# Patient Record
Sex: FEMALE | Race: BLACK OR AFRICAN AMERICAN | NOT HISPANIC OR LATINO | Employment: FULL TIME | URBAN - METROPOLITAN AREA
[De-identification: names, ages, dates, MRNs, and addresses within clinical notes are randomized per-mention and may not be internally consistent; named-entity substitution may affect disease eponyms.]

---

## 2023-10-16 DIAGNOSIS — M20.12 ACQUIRED HALLUX VALGUS OF LEFT FOOT: Primary | ICD-10-CM

## 2023-10-16 RX ORDER — CEFAZOLIN SODIUM 2 G/100ML
2 INJECTION, SOLUTION INTRAVENOUS ONCE
Status: CANCELLED | OUTPATIENT
Start: 2023-12-01 | End: 2023-10-16

## 2023-11-20 ENCOUNTER — HOSPITAL ENCOUNTER (OUTPATIENT)
Dept: CARDIOLOGY | Facility: HOSPITAL | Age: 38
Discharge: HOME | End: 2023-11-20
Payer: MEDICAID

## 2023-11-20 DIAGNOSIS — M20.12 ACQUIRED HALLUX VALGUS OF LEFT FOOT: ICD-10-CM

## 2023-11-20 LAB
ATRIAL RATE: 98 BPM
P AXIS: -15 DEGREES
P OFFSET: 190 MS
P ONSET: 127 MS
PR INTERVAL: 190 MS
Q ONSET: 222 MS
QRS COUNT: 16 BEATS
QRS DURATION: 76 MS
QT INTERVAL: 348 MS
QTC CALCULATION(BAZETT): 444 MS
QTC FREDERICIA: 410 MS
R AXIS: 45 DEGREES
T AXIS: 5 DEGREES
T OFFSET: 396 MS
VENTRICULAR RATE: 98 BPM

## 2023-11-20 PROCEDURE — 93005 ELECTROCARDIOGRAM TRACING: CPT

## 2023-11-20 PROCEDURE — 93010 ELECTROCARDIOGRAM REPORT: CPT | Performed by: INTERNAL MEDICINE

## 2023-11-27 ENCOUNTER — PRE-ADMISSION TESTING (OUTPATIENT)
Dept: PREADMISSION TESTING | Facility: HOSPITAL | Age: 38
End: 2023-11-27
Payer: MEDICAID

## 2023-11-27 VITALS
HEART RATE: 99 BPM | TEMPERATURE: 96.6 F | WEIGHT: 116.84 LBS | HEIGHT: 61 IN | SYSTOLIC BLOOD PRESSURE: 116 MMHG | RESPIRATION RATE: 16 BRPM | DIASTOLIC BLOOD PRESSURE: 99 MMHG | BODY MASS INDEX: 22.06 KG/M2 | OXYGEN SATURATION: 99 %

## 2023-11-27 LAB
ALBUMIN SERPL BCP-MCNC: 4.4 G/DL (ref 3.4–5)
ALP SERPL-CCNC: 37 U/L (ref 33–110)
ALT SERPL W P-5'-P-CCNC: 10 U/L (ref 7–45)
AMPHETAMINES UR QL SCN: ABNORMAL
ANION GAP SERPL CALC-SCNC: 11 MMOL/L (ref 10–20)
AST SERPL W P-5'-P-CCNC: 17 U/L (ref 9–39)
BARBITURATES UR QL SCN: ABNORMAL
BENZODIAZ UR QL SCN: ABNORMAL
BILIRUB SERPL-MCNC: 0.3 MG/DL (ref 0–1.2)
BUN SERPL-MCNC: 11 MG/DL (ref 6–23)
BZE UR QL SCN: ABNORMAL
CALCIUM SERPL-MCNC: 9.6 MG/DL (ref 8.6–10.3)
CANNABINOIDS UR QL SCN: ABNORMAL
CHLORIDE SERPL-SCNC: 101 MMOL/L (ref 98–107)
CO2 SERPL-SCNC: 29 MMOL/L (ref 21–32)
CREAT SERPL-MCNC: 0.66 MG/DL (ref 0.5–1.05)
ERYTHROCYTE [DISTWIDTH] IN BLOOD BY AUTOMATED COUNT: 13.2 % (ref 11.5–14.5)
FENTANYL+NORFENTANYL UR QL SCN: ABNORMAL
GFR SERPL CREATININE-BSD FRML MDRD: >90 ML/MIN/1.73M*2
GLUCOSE SERPL-MCNC: 103 MG/DL (ref 74–99)
HCG UR QL IA.RAPID: NEGATIVE
HCT VFR BLD AUTO: 39.7 % (ref 36–46)
HGB BLD-MCNC: 13.2 G/DL (ref 12–16)
MCH RBC QN AUTO: 33.2 PG (ref 26–34)
MCHC RBC AUTO-ENTMCNC: 33.2 G/DL (ref 32–36)
MCV RBC AUTO: 100 FL (ref 80–100)
NRBC BLD-RTO: 0 /100 WBCS (ref 0–0)
OPIATES UR QL SCN: ABNORMAL
OXYCODONE+OXYMORPHONE UR QL SCN: ABNORMAL
PCP UR QL SCN: ABNORMAL
PLATELET # BLD AUTO: 358 X10*3/UL (ref 150–450)
POTASSIUM SERPL-SCNC: 4.4 MMOL/L (ref 3.5–5.3)
PROT SERPL-MCNC: 7.3 G/DL (ref 6.4–8.2)
RBC # BLD AUTO: 3.97 X10*6/UL (ref 4–5.2)
SODIUM SERPL-SCNC: 137 MMOL/L (ref 136–145)
WBC # BLD AUTO: 7 X10*3/UL (ref 4.4–11.3)

## 2023-11-27 PROCEDURE — 81025 URINE PREGNANCY TEST: CPT | Performed by: PODIATRIST

## 2023-11-27 PROCEDURE — 99203 OFFICE O/P NEW LOW 30 MIN: CPT | Performed by: NURSE PRACTITIONER

## 2023-11-27 PROCEDURE — 80307 DRUG TEST PRSMV CHEM ANLYZR: CPT | Performed by: PODIATRIST

## 2023-11-27 PROCEDURE — 36415 COLL VENOUS BLD VENIPUNCTURE: CPT | Performed by: PODIATRIST

## 2023-11-27 PROCEDURE — 85027 COMPLETE CBC AUTOMATED: CPT | Performed by: PODIATRIST

## 2023-11-27 PROCEDURE — 80053 COMPREHEN METABOLIC PANEL: CPT | Performed by: PODIATRIST

## 2023-11-27 RX ORDER — BUPROPION HYDROCHLORIDE 150 MG/1
TABLET, EXTENDED RELEASE ORAL
COMMUNITY
Start: 2022-05-02

## 2023-11-27 RX ORDER — ALBUTEROL SULFATE 90 UG/1
AEROSOL, METERED RESPIRATORY (INHALATION)
COMMUNITY
Start: 2022-04-05

## 2023-11-27 ASSESSMENT — DUKE ACTIVITY SCORE INDEX (DASI)
CAN YOU DO LIGHT WORK AROUND THE HOUSE LIKE DUSTING OR WASHING DISHES: YES
CAN YOU TAKE CARE OF YOURSELF (EAT, DRESS, BATHE, OR USE TOILET): YES
CAN YOU WALK INDOORS, SUCH AS AROUND YOUR HOUSE: YES
CAN YOU RUN A SHORT DISTANCE: NO
TOTAL_SCORE: 21.45
CAN YOU PARTICIPATE IN MODERATE RECREATIONAL ACTIVITIES LIKE GOLF, BOWLING, DANCING, DOUBLES TENNIS OR THROWING A BASEBALL OR FOOTBALL: NO
CAN YOU CLIMB A FLIGHT OF STAIRS OR WALK UP A HILL: NO
DASI METS SCORE: 5.4
CAN YOU DO HEAVY WORK AROUND THE HOUSE LIKE SCRUBBING FLOORS OR LIFTING AND MOVING HEAVY FURNITURE: YES
CAN YOU DO MODERATE WORK AROUND THE HOUSE LIKE VACUUMING, SWEEPING FLOORS OR CARRYING GROCERIES: YES
CAN YOU HAVE SEXUAL RELATIONS: NO
CAN YOU WALK A BLOCK OR TWO ON LEVEL GROUND: YES
CAN YOU PARTICIPATE IN STRENOUS SPORTS LIKE SWIMMING, SINGLES TENNIS, FOOTBALL, BASKETBALL, OR SKIING: NO
CAN YOU DO YARD WORK LIKE RAKING LEAVES, WEEDING OR PUSHING A MOWER: NO

## 2023-11-27 NOTE — PREPROCEDURE INSTRUCTIONS
Medication List            Accurate as of November 27, 2023  1:16 PM. Always use your most recent med list.                buPROPion  mg 12 hr tablet  Commonly known as: Wellbutrin SR  Medication Adjustments for Surgery: Take morning of surgery with sip of water, no other fluids     ProAir HFA 90 mcg/actuation inhaler  Generic drug: albuterol  Notes to patient: TAKE WHEN NEEDED.                               NPO Instructions:    {NPO Instructions:91936}    Additional Instructions:     {[facility] AVS INSTR:98685}

## 2023-11-27 NOTE — CPM/PAT H&P
"CPM/PAT Evaluation       Name: Siim Mcnair (Simi Mcnair)  /Age: 1985/38 y.o.     In-Person       Chief Complaint: Left foot bunion    38 yr old female with c/o Left foot bunion.  Reports ongoing progressive pain worsened by activity including walking, standing and shoe wear.  Pain is constant ache intensified by \"rubbing of toes against each other\", \"like hammer toes\"; denies open wound, reports skin intact.  States cannot wear required steel toe shoes for work.  States had Right foot bunion removed successfully by Dr Vanessa dunaway in January of this year, adding is happy with results.  Reports remaining otherwise physically active, denies cardiac or respiratory symptoms.  Denies past issues with anesthesia.          Past Medical History:   Diagnosis Date    Anxiety     Chronic bronchitis (CMS/HCC)     Hypertension     Sinus tachycardia        Past Surgical History:   Procedure Laterality Date    BARTHOLIN GLAND CYST EXCISION      BUNIONECTOMY         Patient  has no history on file for sexual activity.    No family history on file.    Allergies   Allergen Reactions    Amoxicillin Unknown    Bactrim [Sulfamethoxazole-Trimethoprim] Unknown       Prior to Admission medications    Not on File        Review of Systems  Constitutional: no fever, no chills and not feeling poorly.   Eyes: no eyesight problems.   ENT: no hearing loss, no nosebleeds and no sore throat.   Cardiovascular: no chest pain, no palpitations and no extremity edema.   Respiratory: no shortness of breath, no wheezing, no cough and no shortness of breath during exertion.   Gastrointestinal: no abdominal pain, no constipation, no heartburn, no diarrhea, no vomiting and no blood in stools.   Genitourinary: no dysuria, no incontinence, normal micturition and no testicular pain.   Musculoskeletal: no arthralgias and no gait abnormality.   Integumentary: no skin lesions, no skin wound and no change in a mole.   Neurological: no confusion, no " dizziness, no fainting and no difficulty walking.   Psychiatric: not suicidal, no anxiety and no depression.   All other systems have been reviewed and are negative for complaint.     Physical Exam  Constitutional:       General: No acute distress.     Aox3, pleasant and cooperative, appropriate mood and eye contact   HENT:      Head: Normocephalic.      Mouth/Throat: Mucous membranes moist & pink  Eyes:      Vision grossly intact, PERRLA   Neck:      No carotid bruit, no JVD  Cardiovascular:      RRR, S1S2, no murmurs, rubs or gallops  Pulmonary:      Symmetric chest expansion, CTA, Room Air  Abdominal:      Soft non-tender, BSx4   Skin:     Warm, dry & intact   Extremities:      No gross deformities; normal gait   Neurological:      No focal deficit, Aox3, ELIZONDO x4  Psychiatric:      Pleasant & cooperative, appropriate affect    PAT AIRWAY:   Airway:     Mallampati::  I    TM distance::  >3 FB    Neck ROM::  Full  normal        Visit Vitals  BP (!) 116/99   Pulse 99   Temp 35.9 °C (96.6 °F)   Resp 16       DASI Risk Score      Flowsheet Row Most Recent Value   DASI SCORE 21.45   METS Score (Will be calculated only when all the questions are answered) 5.4          Caprini DVT Assessment    No data to display       Modified Frailty Index    No data to display       CHADS2 Stroke Risk  Current as of a minute ago        N/A 3 - 100%: High Risk   2 - 3%: Medium Risk   0 - 2%: Low Risk     Last Change: N/A          This score determines the patient's risk of having a stroke if the patient has atrial fibrillation.        This score is not applicable to this patient. Components are not calculated.          Revised Cardiac Risk Index    No data to display       Apfel Simplified Score    No data to display       Risk Analysis Index Results This Encounter    No data found in the last 1 encounters.       Stop Bang Score      Flowsheet Row Most Recent Value   Do you snore loudly? 1   Do you often feel tired or fatigued after  your sleep? 0   Has anyone ever observed you stop breathing in your sleep? 0   Do you have or are you being treated for high blood pressure? 1   Recent BMI (Calculated) 22.1   Is BMI greater than 35 kg/m2? 0=No   Age older than 50 years old? 0=No   Is your neck circumference greater than 17 inches (Male) or 16 inches (Female)? 0   Gender - Male 0=No   STOP-BANG Total Score 2            Assessment and Plan:     Followed by podiatry, acquired hallux valgus of Left foot    Left foot mis bunionectomy w/double osteotomy and mini c-arm w/Dr Mendez on 12/1/2023

## 2023-11-27 NOTE — PREPROCEDURE INSTRUCTIONS
Medication List            Accurate as of November 27, 2023  1:14 PM. Always use your most recent med list.                buPROPion  mg 12 hr tablet  Commonly known as: Wellbutrin SR     ProAir HFA 90 mcg/actuation inhaler  Generic drug: albuterol            One of our staff members will call you one business day before your surgery between 12-4pm to let you know the time to arrive at the hospital. If you have not received a phone call by 2pm call [phone].     When you arrive at the hospital, go to Registration on the ground floor.   You will need a responsible adult to drive you home.     Prior to surgery date:  One (1) week prior to surgery STOP:  -Stop aspirin products. Do not take NSAIDS/ Ibuprofen, Aleve, Motrin. Okay to take Tylenol or Acetaminophen. Do not take vitamins, supplements, herbals, diet pills.   -Stop these medications: __________________________________________________________  -No alcohol for 24 hours prior to surgery.  -No smoking 24 hours prior. No Marijuana, CBD oil, or Vaping 48 hours prior to surgery.  -Enjoy a light supper the evening before surgery.    Day of Surgery:  -Nothing to eat or drink after midnight. This includes food of any kind (including hard candy, cough drops, mints and gum, coffee).   -No acrylic nails or nail polish on at least one fingernail; no polish on toes for foot surgery.   -No body piercing or jewelry.   -Shower as directed. No deodorant, lotion, power, oils, perfume, make-up.   -Wear loose comfortable clothing to accommodate your bandages.     -Take the following medication with a very small amount of water the morning of surgery:    TAKE BUPROPION WITH SIPS OF WATER MORNING OF SURGERY. ___________________________________    Contact you doctor if you take blood thinners to determine when to stop:    __________________________________________________________________________________    --Bring as needed inhalers  -Bring urine specimen if  directed  -Bring crutches/ walker if directed           NPO Instructions:    Do not eat any food after midnight the night before your surgery/procedure.    Additional Instructions:     You will be contacted regarding the time of your arrival to facility and surgery time

## 2023-11-27 NOTE — PREPROCEDURE INSTRUCTIONS
Medication List      as of November 27, 2023  1:12 PM     You have not been prescribed any medications.     One of our staff members will call you one business day before your surgery between 12-4pm to let you know the time to arrive at the hospital. If you have not received a phone call by 2pm call [phone].     When you arrive at the hospital, go to Registration on the ground floor.   You will need a responsible adult to drive you home.     Prior to surgery date:  One (1) week prior to surgery STOP:  -Stop aspirin products. Do not take NSAIDS/ Ibuprofen, Aleve, Motrin. Okay to take Tylenol or Acetaminophen. Do not take vitamins, supplements, herbals, diet pills.   -Stop these medications: __________________________________________________________  -No alcohol for 24 hours prior to surgery.  -No smoking 24 hours prior. No Marijuana, CBD oil, or Vaping 48 hours prior to surgery.  -Enjoy a light supper the evening before surgery.    Day of Surgery:  -Nothing to eat or drink after midnight. This includes food of any kind (including hard candy, cough drops, mints and gum, coffee).   -No acrylic nails or nail polish on at least one fingernail; no polish on toes for foot surgery.   -No body piercing or jewelry.   -Shower as directed. No deodorant, lotion, power, oils, perfume, make-up.   -Wear loose comfortable clothing to accommodate your bandages.     -Bring as needed inhalers  -Bring urine specimen if directed  -Bring crutches/ walker if directed          NPO Instructions:    Do not eat any food after midnight the night before your surgery/procedure.    Additional Instructions:     You will be contacted regarding the time of your arrival to facility and surgery time

## 2023-11-27 NOTE — H&P (VIEW-ONLY)
"CPM/PAT Evaluation       Name: Simi Mcnair (Simi Mcnair)  /Age: 1985/38 y.o.     In-Person       Chief Complaint: Left foot bunion    38 yr old female with c/o Left foot bunion.  Reports ongoing progressive pain worsened by activity including walking, standing and shoe wear.  Pain is constant ache intensified by \"rubbing of toes against each other\", \"like hammer toes\"; denies open wound, reports skin intact.  States cannot wear required steel toe shoes for work.  States had Right foot bunion removed successfully by Dr Vanessa dunaway in January of this year, adding is happy with results.  Reports remaining otherwise physically active, denies cardiac or respiratory symptoms.  Denies past issues with anesthesia.          Past Medical History:   Diagnosis Date    Anxiety     Chronic bronchitis (CMS/HCC)     Hypertension     Sinus tachycardia        Past Surgical History:   Procedure Laterality Date    BARTHOLIN GLAND CYST EXCISION      BUNIONECTOMY         Patient  has no history on file for sexual activity.    No family history on file.    Allergies   Allergen Reactions    Amoxicillin Unknown    Bactrim [Sulfamethoxazole-Trimethoprim] Unknown       Prior to Admission medications    Not on File        Review of Systems  Constitutional: no fever, no chills and not feeling poorly.   Eyes: no eyesight problems.   ENT: no hearing loss, no nosebleeds and no sore throat.   Cardiovascular: no chest pain, no palpitations and no extremity edema.   Respiratory: no shortness of breath, no wheezing, no cough and no shortness of breath during exertion.   Gastrointestinal: no abdominal pain, no constipation, no heartburn, no diarrhea, no vomiting and no blood in stools.   Genitourinary: no dysuria, no incontinence, normal micturition and no testicular pain.   Musculoskeletal: no arthralgias and no gait abnormality.   Integumentary: no skin lesions, no skin wound and no change in a mole.   Neurological: no confusion, no " dizziness, no fainting and no difficulty walking.   Psychiatric: not suicidal, no anxiety and no depression.   All other systems have been reviewed and are negative for complaint.     Physical Exam  Constitutional:       General: No acute distress.     Aox3, pleasant and cooperative, appropriate mood and eye contact   HENT:      Head: Normocephalic.      Mouth/Throat: Mucous membranes moist & pink  Eyes:      Vision grossly intact, PERRLA   Neck:      No carotid bruit, no JVD  Cardiovascular:      RRR, S1S2, no murmurs, rubs or gallops  Pulmonary:      Symmetric chest expansion, CTA, Room Air  Abdominal:      Soft non-tender, BSx4   Skin:     Warm, dry & intact   Extremities:      No gross deformities; normal gait   Neurological:      No focal deficit, Aox3, ELIZONDO x4  Psychiatric:      Pleasant & cooperative, appropriate affect    PAT AIRWAY:   Airway:     Mallampati::  I    TM distance::  >3 FB    Neck ROM::  Full  normal        Visit Vitals  BP (!) 116/99   Pulse 99   Temp 35.9 °C (96.6 °F)   Resp 16       DASI Risk Score      Flowsheet Row Most Recent Value   DASI SCORE 21.45   METS Score (Will be calculated only when all the questions are answered) 5.4          Caprini DVT Assessment    No data to display       Modified Frailty Index    No data to display       CHADS2 Stroke Risk  Current as of a minute ago        N/A 3 - 100%: High Risk   2 - 3%: Medium Risk   0 - 2%: Low Risk     Last Change: N/A          This score determines the patient's risk of having a stroke if the patient has atrial fibrillation.        This score is not applicable to this patient. Components are not calculated.          Revised Cardiac Risk Index    No data to display       Apfel Simplified Score    No data to display       Risk Analysis Index Results This Encounter    No data found in the last 1 encounters.       Stop Bang Score      Flowsheet Row Most Recent Value   Do you snore loudly? 1   Do you often feel tired or fatigued after  your sleep? 0   Has anyone ever observed you stop breathing in your sleep? 0   Do you have or are you being treated for high blood pressure? 1   Recent BMI (Calculated) 22.1   Is BMI greater than 35 kg/m2? 0=No   Age older than 50 years old? 0=No   Is your neck circumference greater than 17 inches (Male) or 16 inches (Female)? 0   Gender - Male 0=No   STOP-BANG Total Score 2            Assessment and Plan:     Followed by podiatry, acquired hallux valgus of Left foot    Left foot mis bunionectomy w/double osteotomy and mini c-arm w/Dr Mendez on 12/1/2023

## 2023-12-01 ENCOUNTER — ANESTHESIA EVENT (OUTPATIENT)
Dept: OPERATING ROOM | Facility: HOSPITAL | Age: 38
End: 2023-12-01
Payer: MEDICAID

## 2023-12-01 ENCOUNTER — HOSPITAL ENCOUNTER (OUTPATIENT)
Facility: HOSPITAL | Age: 38
Setting detail: OUTPATIENT SURGERY
Discharge: HOME | End: 2023-12-01
Attending: PODIATRIST | Admitting: PODIATRIST
Payer: MEDICAID

## 2023-12-01 ENCOUNTER — ANESTHESIA (OUTPATIENT)
Dept: OPERATING ROOM | Facility: HOSPITAL | Age: 38
End: 2023-12-01
Payer: MEDICAID

## 2023-12-01 VITALS
BODY MASS INDEX: 22.08 KG/M2 | RESPIRATION RATE: 16 BRPM | DIASTOLIC BLOOD PRESSURE: 92 MMHG | SYSTOLIC BLOOD PRESSURE: 129 MMHG | HEART RATE: 85 BPM | WEIGHT: 116.84 LBS | TEMPERATURE: 97.5 F | OXYGEN SATURATION: 98 %

## 2023-12-01 DIAGNOSIS — M20.12 ACQUIRED HALLUX VALGUS OF LEFT FOOT: Primary | ICD-10-CM

## 2023-12-01 PROBLEM — Z98.890 HISTORY OF GENERAL ANESTHESIA: Status: ACTIVE | Noted: 2023-12-01

## 2023-12-01 PROBLEM — J45.909 ASTHMA (HHS-HCC): Status: ACTIVE | Noted: 2023-12-01

## 2023-12-01 PROBLEM — F41.9 ANXIETY: Status: ACTIVE | Noted: 2023-12-01

## 2023-12-01 PROBLEM — F32.A DEPRESSION: Status: ACTIVE | Noted: 2023-12-01

## 2023-12-01 LAB — PREGNANCY TEST URINE, POC: NEGATIVE

## 2023-12-01 PROCEDURE — 7100000001 HC RECOVERY ROOM TIME - INITIAL BASE CHARGE: Performed by: PODIATRIST

## 2023-12-01 PROCEDURE — 3700000001 HC GENERAL ANESTHESIA TIME - INITIAL BASE CHARGE: Performed by: PODIATRIST

## 2023-12-01 PROCEDURE — 2500000004 HC RX 250 GENERAL PHARMACY W/ HCPCS (ALT 636 FOR OP/ED): Performed by: NURSE ANESTHETIST, CERTIFIED REGISTERED

## 2023-12-01 PROCEDURE — 7100000010 HC PHASE TWO TIME - EACH INCREMENTAL 1 MINUTE: Performed by: PODIATRIST

## 2023-12-01 PROCEDURE — 3600000003 HC OR TIME - INITIAL BASE CHARGE - PROCEDURE LEVEL THREE: Performed by: PODIATRIST

## 2023-12-01 PROCEDURE — 3700000002 HC GENERAL ANESTHESIA TIME - EACH INCREMENTAL 1 MINUTE: Performed by: PODIATRIST

## 2023-12-01 PROCEDURE — 2500000004 HC RX 250 GENERAL PHARMACY W/ HCPCS (ALT 636 FOR OP/ED): Performed by: PODIATRIST

## 2023-12-01 PROCEDURE — 81025 URINE PREGNANCY TEST: CPT | Performed by: PODIATRIST

## 2023-12-01 PROCEDURE — 2500000005 HC RX 250 GENERAL PHARMACY W/O HCPCS: Performed by: NURSE ANESTHETIST, CERTIFIED REGISTERED

## 2023-12-01 PROCEDURE — A28299 PR CORRECT BUNION,DOUBLE OSTEOTOMY: Performed by: NURSE ANESTHETIST, CERTIFIED REGISTERED

## 2023-12-01 PROCEDURE — 2780000003 HC OR 278 NO HCPCS: Performed by: PODIATRIST

## 2023-12-01 PROCEDURE — 3600000008 HC OR TIME - EACH INCREMENTAL 1 MINUTE - PROCEDURE LEVEL THREE: Performed by: PODIATRIST

## 2023-12-01 PROCEDURE — 7100000002 HC RECOVERY ROOM TIME - EACH INCREMENTAL 1 MINUTE: Performed by: PODIATRIST

## 2023-12-01 PROCEDURE — 7100000009 HC PHASE TWO TIME - INITIAL BASE CHARGE: Performed by: PODIATRIST

## 2023-12-01 PROCEDURE — 2720000007 HC OR 272 NO HCPCS: Performed by: PODIATRIST

## 2023-12-01 PROCEDURE — A28299 PR CORRECT BUNION,DOUBLE OSTEOTOMY: Performed by: ANESTHESIOLOGY

## 2023-12-01 DEVICE — IMPLANTABLE DEVICE: Type: IMPLANTABLE DEVICE | Site: FOOT | Status: FUNCTIONAL

## 2023-12-01 RX ORDER — PROPOFOL 10 MG/ML
INJECTION, EMULSION INTRAVENOUS AS NEEDED
Status: DISCONTINUED | OUTPATIENT
Start: 2023-12-01 | End: 2023-12-01

## 2023-12-01 RX ORDER — LIDOCAINE HYDROCHLORIDE 10 MG/ML
0.1 INJECTION INFILTRATION; PERINEURAL ONCE
Status: CANCELLED | OUTPATIENT
Start: 2023-12-01 | End: 2023-12-01

## 2023-12-01 RX ORDER — CEFAZOLIN 1 G/1
INJECTION, POWDER, FOR SOLUTION INTRAVENOUS AS NEEDED
Status: DISCONTINUED | OUTPATIENT
Start: 2023-12-01 | End: 2023-12-01

## 2023-12-01 RX ORDER — BUPIVACAINE HYDROCHLORIDE 5 MG/ML
INJECTION, SOLUTION EPIDURAL; INTRACAUDAL AS NEEDED
Status: DISCONTINUED | OUTPATIENT
Start: 2023-12-01 | End: 2023-12-01 | Stop reason: HOSPADM

## 2023-12-01 RX ORDER — ASPIRIN 81 MG
100 TABLET, DELAYED RELEASE (ENTERIC COATED) ORAL 2 TIMES DAILY
Qty: 10 TABLET | Refills: 0 | Status: SHIPPED | OUTPATIENT
Start: 2023-12-01 | End: 2023-12-06

## 2023-12-01 RX ORDER — CEFAZOLIN SODIUM 2 G/100ML
2 INJECTION, SOLUTION INTRAVENOUS ONCE
Status: DISCONTINUED | OUTPATIENT
Start: 2023-12-01 | End: 2023-12-01 | Stop reason: HOSPADM

## 2023-12-01 RX ORDER — ONDANSETRON HYDROCHLORIDE 2 MG/ML
INJECTION, SOLUTION INTRAVENOUS AS NEEDED
Status: DISCONTINUED | OUTPATIENT
Start: 2023-12-01 | End: 2023-12-01

## 2023-12-01 RX ORDER — DEXMEDETOMIDINE HYDROCHLORIDE 100 UG/ML
INJECTION, SOLUTION INTRAVENOUS AS NEEDED
Status: DISCONTINUED | OUTPATIENT
Start: 2023-12-01 | End: 2023-12-01

## 2023-12-01 RX ORDER — OXYCODONE AND ACETAMINOPHEN 5; 325 MG/1; MG/1
1 TABLET ORAL EVERY 8 HOURS PRN
Qty: 21 TABLET | Refills: 0 | Status: SHIPPED | OUTPATIENT
Start: 2023-12-01 | End: 2023-12-08

## 2023-12-01 RX ORDER — SODIUM CHLORIDE, SODIUM LACTATE, POTASSIUM CHLORIDE, CALCIUM CHLORIDE 600; 310; 30; 20 MG/100ML; MG/100ML; MG/100ML; MG/100ML
100 INJECTION, SOLUTION INTRAVENOUS CONTINUOUS
Status: CANCELLED | OUTPATIENT
Start: 2023-12-01

## 2023-12-01 RX ORDER — LIDOCAINE HYDROCHLORIDE 20 MG/ML
INJECTION, SOLUTION INFILTRATION; PERINEURAL AS NEEDED
Status: DISCONTINUED | OUTPATIENT
Start: 2023-12-01 | End: 2023-12-01

## 2023-12-01 RX ORDER — DEXAMETHASONE SODIUM PHOSPHATE 4 MG/ML
INJECTION, SOLUTION INTRA-ARTICULAR; INTRALESIONAL; INTRAMUSCULAR; INTRAVENOUS; SOFT TISSUE AS NEEDED
Status: DISCONTINUED | OUTPATIENT
Start: 2023-12-01 | End: 2023-12-01

## 2023-12-01 RX ORDER — SODIUM CHLORIDE, SODIUM LACTATE, POTASSIUM CHLORIDE, CALCIUM CHLORIDE 600; 310; 30; 20 MG/100ML; MG/100ML; MG/100ML; MG/100ML
100 INJECTION, SOLUTION INTRAVENOUS CONTINUOUS
Status: DISCONTINUED | OUTPATIENT
Start: 2023-12-01 | End: 2023-12-01 | Stop reason: HOSPADM

## 2023-12-01 RX ORDER — TRANEXAMIC ACID 10 MG/ML
1000 INJECTION, SOLUTION INTRAVENOUS ONCE
Status: COMPLETED | OUTPATIENT
Start: 2023-12-01 | End: 2023-12-01

## 2023-12-01 RX ORDER — MEPERIDINE HYDROCHLORIDE 25 MG/ML
12.5 INJECTION INTRAMUSCULAR; INTRAVENOUS; SUBCUTANEOUS EVERY 10 MIN PRN
Status: CANCELLED | OUTPATIENT
Start: 2023-12-01

## 2023-12-01 RX ORDER — MIDAZOLAM HYDROCHLORIDE 1 MG/ML
INJECTION, SOLUTION INTRAMUSCULAR; INTRAVENOUS AS NEEDED
Status: DISCONTINUED | OUTPATIENT
Start: 2023-12-01 | End: 2023-12-01

## 2023-12-01 RX ORDER — HYDROMORPHONE HYDROCHLORIDE 1 MG/ML
1 INJECTION, SOLUTION INTRAMUSCULAR; INTRAVENOUS; SUBCUTANEOUS EVERY 5 MIN PRN
Status: CANCELLED | OUTPATIENT
Start: 2023-12-01

## 2023-12-01 RX ORDER — FENTANYL CITRATE 50 UG/ML
INJECTION, SOLUTION INTRAMUSCULAR; INTRAVENOUS AS NEEDED
Status: DISCONTINUED | OUTPATIENT
Start: 2023-12-01 | End: 2023-12-01

## 2023-12-01 RX ORDER — METOCLOPRAMIDE HYDROCHLORIDE 5 MG/ML
INJECTION INTRAMUSCULAR; INTRAVENOUS AS NEEDED
Status: DISCONTINUED | OUTPATIENT
Start: 2023-12-01 | End: 2023-12-01

## 2023-12-01 RX ORDER — ASCORBIC ACID 500 MG
500 TABLET ORAL 2 TIMES DAILY
Qty: 60 TABLET | Refills: 0 | Status: SHIPPED | OUTPATIENT
Start: 2023-12-01 | End: 2023-12-31

## 2023-12-01 RX ADMIN — DEXMEDETOMIDINE HCL 4 MCG: 100 INJECTION INTRAVENOUS at 09:34

## 2023-12-01 RX ADMIN — SODIUM CHLORIDE, POTASSIUM CHLORIDE, SODIUM LACTATE AND CALCIUM CHLORIDE 100 ML/HR: 600; 310; 30; 20 INJECTION, SOLUTION INTRAVENOUS at 07:31

## 2023-12-01 RX ADMIN — METOCLOPRAMIDE 10 MG: 5 INJECTION, SOLUTION INTRAMUSCULAR; INTRAVENOUS at 07:51

## 2023-12-01 RX ADMIN — FENTANYL CITRATE 25 MCG: 50 INJECTION, SOLUTION INTRAMUSCULAR; INTRAVENOUS at 09:17

## 2023-12-01 RX ADMIN — FENTANYL CITRATE 25 MCG: 50 INJECTION, SOLUTION INTRAMUSCULAR; INTRAVENOUS at 09:02

## 2023-12-01 RX ADMIN — DEXMEDETOMIDINE HCL 4 MCG: 100 INJECTION INTRAVENOUS at 09:27

## 2023-12-01 RX ADMIN — FENTANYL CITRATE 25 MCG: 50 INJECTION, SOLUTION INTRAMUSCULAR; INTRAVENOUS at 09:06

## 2023-12-01 RX ADMIN — DEXAMETHASONE SODIUM PHOSPHATE 4 MG: 4 INJECTION, SOLUTION INTRAMUSCULAR; INTRAVENOUS at 07:51

## 2023-12-01 RX ADMIN — ONDANSETRON 4 MG: 2 INJECTION INTRAMUSCULAR; INTRAVENOUS at 09:31

## 2023-12-01 RX ADMIN — SODIUM CHLORIDE, POTASSIUM CHLORIDE, SODIUM LACTATE AND CALCIUM CHLORIDE: 600; 310; 30; 20 INJECTION, SOLUTION INTRAVENOUS at 07:36

## 2023-12-01 RX ADMIN — DEXMEDETOMIDINE HCL 4 MCG: 100 INJECTION INTRAVENOUS at 09:21

## 2023-12-01 RX ADMIN — MIDAZOLAM 2 MG: 1 INJECTION INTRAMUSCULAR; INTRAVENOUS at 07:38

## 2023-12-01 RX ADMIN — PROPOFOL 150 MG: 10 INJECTION, EMULSION INTRAVENOUS at 07:42

## 2023-12-01 RX ADMIN — TRANEXAMIC ACID 1000 MG: 10 INJECTION, SOLUTION INTRAVENOUS at 09:15

## 2023-12-01 RX ADMIN — FENTANYL CITRATE 25 MCG: 50 INJECTION, SOLUTION INTRAMUSCULAR; INTRAVENOUS at 08:49

## 2023-12-01 RX ADMIN — FENTANYL CITRATE 100 MCG: 50 INJECTION, SOLUTION INTRAMUSCULAR; INTRAVENOUS at 07:42

## 2023-12-01 RX ADMIN — DEXMEDETOMIDINE HCL 8 MCG: 100 INJECTION INTRAVENOUS at 08:47

## 2023-12-01 RX ADMIN — CEFAZOLIN SODIUM 2 G: 1 POWDER, FOR SOLUTION INTRAMUSCULAR; INTRAVENOUS at 07:52

## 2023-12-01 RX ADMIN — LIDOCAINE HYDROCHLORIDE 100 MG: 20 INJECTION, SOLUTION INFILTRATION; PERINEURAL at 07:42

## 2023-12-01 SDOH — HEALTH STABILITY: MENTAL HEALTH: CURRENT SMOKER: 1

## 2023-12-01 ASSESSMENT — PAIN - FUNCTIONAL ASSESSMENT
PAIN_FUNCTIONAL_ASSESSMENT: 0-10

## 2023-12-01 ASSESSMENT — PAIN SCALES - GENERAL
PAINLEVEL_OUTOF10: 0 - NO PAIN
PAIN_LEVEL: 0
PAINLEVEL_OUTOF10: 0 - NO PAIN

## 2023-12-01 ASSESSMENT — COLUMBIA-SUICIDE SEVERITY RATING SCALE - C-SSRS
2. HAVE YOU ACTUALLY HAD ANY THOUGHTS OF KILLING YOURSELF?: NO
6. HAVE YOU EVER DONE ANYTHING, STARTED TO DO ANYTHING, OR PREPARED TO DO ANYTHING TO END YOUR LIFE?: NO
1. IN THE PAST MONTH, HAVE YOU WISHED YOU WERE DEAD OR WISHED YOU COULD GO TO SLEEP AND NOT WAKE UP?: NO

## 2023-12-01 NOTE — OP NOTE
LEFT FOOT MIS BUNIONECTOMY WITH DOUBLE OSTEOTOMY AND MINI C-ARM (L) Operative Note     Date: 2023  OR Location: [facility]    Name: Simi Mcnair : 1985, Age: 38 y.o., MRN: [id number], Sex: female    Diagnosis  Pre-op Diagnosis     * Acquired hallux valgus of left foot [M20.12] Post-op Diagnosis     * Acquired hallux valgus of left foot [M20.12]     Procedures  LEFT FOOT MIS BUNIONECTOMY WITH DOUBLE OSTEOTOMY AND MINI C-ARM  27318 - OR CORRJ HALLUX VALGUS W/SESMDC W/2 OSTEOT      Surgeons      * Duane J Ehredt - Primary    Resident/Fellow/Other Assistant:  Surgeon(s) and Role: Sofiya LAUREN and Johnathon MSIII    Procedure Summary  Anesthesia: General  ASA: II  Anesthesia Staff: Anesthesiologist: Romeo Martínez MD  CRNA: MELVA Tellez-CRNA  Estimated Blood Loss: 5mL  Intra-op Medications:   Medication Name Total Dose   bupivacaine PF (Marcaine) 0.5 % (5 mg/mL) injection 20 mL   lactated Ringer's infusion 248.33 mL   tranexamic acid in NaCl,iso-os 1,000 mg/100 mL (10 mg/mL) IV 1,000 mg 1,000 mg              Anesthesia Record               Intraprocedure I/O Totals          Intake    Propofol Drip 0.00 mL    The total shown is the total volume documented since Anesthesia Start was filed.    Total Intake 0 mL          Specimen: No specimens collected     Staff:   Circulator: Marci Byrd RN  Relief Circulator: Ivelisse Elizalde RN  Scrub Person: Libia Nugent         Drains and/or Catheters: * None in log *    Tourniquet Times:   * Missing tourniquet times found for documented tourniquets in lo *250 mmHg for 61 minutes.  It should be noted there was deflated prior to wound closure and intraoperative hemostasis was achieved.    Implants:  Implants       Type Name Action Serial No.      Screw 36mm Screw Implanted N/A     Screw 32mm Screw Implanted NA     Screw 30mm Screw Implanted N/A              Findings: Consistent with diagnosis.  Hallux abductovalgus of the left foot.    Indications: Simi  Xu is an 38 y.o. female who is having surgery for Acquired hallux valgus of left foot [M20.12].  Ms. Mcnair is known to me at the [facility] foot [facility] ankle clinic.  She has history of hallux abductovalgus bilaterally I previously performed a minimally invasive bunionectomy on the right side and she had done very well with this and has elected for same correction on the left side.  She is undergone a full surgical consultation as an outpatient will wear the wrist including possibility of loss of limb or life expectancy of the case without any outside influence.    The patient was seen in the preoperative area. The risks, benefits, complications, treatment options, non-operative alternatives, expected recovery and outcomes were discussed with the patient. The possibilities of reaction to medication, pulmonary aspiration, injury to surrounding structures, bleeding, recurrent infection, the need for additional procedures, failure to diagnose a condition, and creating a complication requiring transfusion or operation were discussed with the patient. The patient concurred with the proposed plan, giving informed consent.  The site of surgery was properly noted/marked if necessary per policy. The patient has been actively warmed in preoperative area. Preoperative antibiotics have been ordered and given within 1 hours of incision. Venous thrombosis prophylaxis have been ordered including unilateral sequential compression device    Procedure Details: Patient brought the operating room placed upper table supine position the left lower extremity was scrubbed prepped draped you sterile fashion prophylactic antibiotics were administered attention was then directed to the left foot where under radiographic control I identified the first ray including the increased IM angle and hallux abductus angle.  I drew out an osteotomy at the surgical neck under radiographic control.  I then used a low speed high torque noel system by [employer]  to perform a transverse osteotomy of the first metatarsal neck.  Once this was complete I was able to translocate the capital fragment laterally I also internally rotated the first ray to reduce the sesamoid complex this was held in place with a temporary pin.  Under radiographic control I noted the deformity correction in all 3 cardinal planes.  The osteotomy was then fixated with 2 beveled FT screws by the [employer] system 1 was a 4.0 mm the other was a 3.5 mm these were drilled measured and inserted utilize recommend manufactures technique.  We then noted need for a Juan Antonio osteotomy of the great toe.  Under radiographic control I performed a oblique tricortical osteotomy utilizing the low speed high torque system.  The wedge was closed down medially reducing the deformity and putting the digit in a completely rectus position.  This osteotomy was then fixated with a 3.5 mm beveled FT screw from the [employer] system.  It was drilled measured and inserted utilize recommend manufactures technique.  Multiple radiographic images and confirmed rectus position of the first ray as well as apposition of both osteotomies and hardware position.  The small incisions were closed with close maximal sterile saline deep layer closed with subcutaneous tissue 2-0 Vicryl simple buried fashion skin was closed with 4-0 Prolene in simple interrupted fashion.  It was then cleansed and patted dry bacitracin ointment and Adaptic placed incision sites with dry sterile dressing Coban for edema management the patient's left lower extremity was then placed in a multilayer Stallworth compression bandage with a posterior splint with the foot 90 degrees relative the long axis of the limb.  Complications:  None; patient tolerated the procedure well.    Disposition: PACU - hemodynamically stable.  Condition: stable         Additional Details:     Attending Attestation: I was present and scrubbed for the entire procedure.    Duane J Ehredt  Phone Number:  [phone]

## 2023-12-01 NOTE — ANESTHESIA PREPROCEDURE EVALUATION
Patient: Simi Mcnair    Procedure Information       Date/Time: 12/01/23 0730    Procedure: LEFT FOOT MIS BUNIONECTOMY WITH DOUBLE OSTEOTOMY AND MINI C-ARM (Left: Foot) - MIS Bunionectomy/Double Osteotomy Left Foot    Location: [facility] OR 02 / [facility]    Surgeons: Duane J Ehredt, DPM            Relevant Problems   Anesthesia   (+) History of general anesthesia      Neuro/Psych   (+) Anxiety   (+) Depression      Pulmonary   (+) Asthma       Clinical information reviewed:    Allergies                NPO Detail:  NPO/Void Status  Date of Last Liquid: 11/30/23  Time of Last Liquid: 2300  Date of Last Solid: 11/30/23  Time of Last Solid: 1700         Physical Exam    Airway  Mallampati: I  TM distance: >3 FB  Neck ROM: full     Cardiovascular - normal exam     Dental - normal exam     Pulmonary - normal exam     Abdominal - normal exam             Anesthesia Plan    ASA 2     general     The patient is a current smoker.  Patient was previously instructed to abstain from smoking on day of procedure.  Patient did not smoke on day of procedure.    intravenous induction   Postoperative administration of opioids is intended.  Trial extubation is planned.  Anesthetic plan and risks discussed with patient.  Use of blood products discussed with patient who consented to blood products.    Plan discussed with CRNA.

## 2023-12-01 NOTE — ANESTHESIA POSTPROCEDURE EVALUATION
Patient: Simi Mcnair    Procedure Summary       Date: 12/01/23 Room / Location: [facility] OR 02 / [facility]    Anesthesia Start: 0736 Anesthesia Stop: 0946    Procedure: LEFT FOOT MIS BUNIONECTOMY WITH DOUBLE OSTEOTOMY AND MINI C-ARM (Left: Foot) Diagnosis:       Acquired hallux valgus of left foot      (Acquired hallux valgus of left foot [M20.12])    Surgeons: Duane J Ehredt, DPM Responsible Provider: Romeo Martínez MD    Anesthesia Type: general ASA Status: 2            Anesthesia Type: general    Vitals Value Taken Time   /79 12/01/23 0945   Temp 36 12/01/23 0946   Pulse 93 12/01/23 0944   Resp 12 12/01/23 0946   SpO2 99 % 12/01/23 0944   Vitals shown include unvalidated device data.    Anesthesia Post Evaluation    Patient location during evaluation: PACU  Patient participation: complete - patient participated  Level of consciousness: awake and alert  Pain score: 0  Pain management: adequate  Multimodal analgesia pain management approach  Airway patency: patent  Cardiovascular status: acceptable  Respiratory status: acceptable  Hydration status: acceptable  Postoperative Nausea and Vomiting: none        No notable events documented.

## 2023-12-01 NOTE — BRIEF OP NOTE
Date: 2023  OR Location: [facility]    Name: Simi Mcnair, : 1985, Age: 38 y.o., MRN: [id number], Sex: female    Diagnosis  Pre-op Diagnosis     * Acquired hallux valgus of left foot [M20.12] Post-op Diagnosis     * Acquired hallux valgus of left foot [M20.12]     Procedures  LEFT FOOT MIS BUNIONECTOMY WITH DOUBLE OSTEOTOMY AND MINI C-ARM  90241 - NM CORRJ HALLUX VALGUS W/SESMDC W/2 OSTEOT      Surgeons      * Duane J Ehredt - Primary    Resident/Fellow/Other Assistant:  Surgeon(s) and Role: Sofiya LAUREN and Johnathon MSIII    Procedure Summary  Anesthesia: General  ASA: II  Anesthesia Staff: Anesthesiologist: Romeo Martínez MD  CRNA: MELVA Tellez-CRNA  Estimated Blood Loss: 5mL  Intra-op Medications:   Medication Name Total Dose   bupivacaine PF (Marcaine) 0.5 % (5 mg/mL) injection 20 mL   lactated Ringer's infusion 248.33 mL   tranexamic acid in NaCl,iso-os 1,000 mg/100 mL (10 mg/mL) IV 1,000 mg 1,000 mg              Anesthesia Record               Intraprocedure I/O Totals          Intake    Propofol Drip 0.00 mL    The total shown is the total volume documented since Anesthesia Start was filed.    Total Intake 0 mL          Specimen: No specimens collected     Staff:   Circulator: Marci Byrd RN  Relief Circulator: Ivelisse Elizalde RN  Scrub Person: Libia Nugent          Findings: Consistent with diagnosis.  Hallux abductovalgus of the left foot.    Complications:  None; patient tolerated the procedure well.     Disposition: PACU - hemodynamically stable.  Condition: stable  Specimens Collected: No specimens collected  Attending Attestation: I was present and scrubbed for the entire procedure.    Duane J Ehredt  Phone Number: [phone]

## 2023-12-01 NOTE — ANESTHESIA PROCEDURE NOTES
Airway  Date/Time: 12/1/2023 7:45 AM  Urgency: elective    Airway not difficult    Staffing  Performed: CRNA   Authorized by: Romeo Martínez MD    Performed by: MELVA Tellez-CURLY  Patient location during procedure: OR    Indications and Patient Condition  Indications for airway management: anesthesia and airway protection  Spontaneous ventilation: present  Sedation level: deep  Preoxygenated: yes  Patient position: sniffing  MILS maintained throughout  Mask difficulty assessment: 0 - not attempted  Planned trial extubation    Final Airway Details  Final airway type: supraglottic airway      Successful airway: unique  Size 3     Number of attempts at approach: 1  Number of other approaches attempted: 0

## 2024-05-30 ENCOUNTER — CLINICAL SUPPORT (OUTPATIENT)
Dept: EMERGENCY MEDICINE | Facility: HOSPITAL | Age: 39
End: 2024-05-30
Payer: MEDICAID

## 2024-05-30 ENCOUNTER — HOSPITAL ENCOUNTER (EMERGENCY)
Facility: HOSPITAL | Age: 39
Discharge: HOME | End: 2024-05-30
Payer: MEDICAID

## 2024-05-30 VITALS
DIASTOLIC BLOOD PRESSURE: 87 MMHG | SYSTOLIC BLOOD PRESSURE: 127 MMHG | BODY MASS INDEX: 22.66 KG/M2 | OXYGEN SATURATION: 98 % | HEART RATE: 132 BPM | HEIGHT: 61 IN | RESPIRATION RATE: 16 BRPM | TEMPERATURE: 96.8 F | WEIGHT: 120 LBS

## 2024-05-30 LAB
ALBUMIN SERPL BCP-MCNC: 4.6 G/DL (ref 3.4–5)
ALP SERPL-CCNC: 45 U/L (ref 33–110)
ALT SERPL W P-5'-P-CCNC: 12 U/L (ref 7–45)
ANION GAP SERPL CALC-SCNC: 17 MMOL/L (ref 10–20)
AST SERPL W P-5'-P-CCNC: 23 U/L (ref 9–39)
ATRIAL RATE: 127 BPM
BASOPHILS # BLD AUTO: 0.04 X10*3/UL (ref 0–0.1)
BASOPHILS NFR BLD AUTO: 0.5 %
BILIRUB SERPL-MCNC: 0.2 MG/DL (ref 0–1.2)
BUN SERPL-MCNC: 9 MG/DL (ref 6–23)
CALCIUM SERPL-MCNC: 9.6 MG/DL (ref 8.6–10.6)
CHLORIDE SERPL-SCNC: 103 MMOL/L (ref 98–107)
CO2 SERPL-SCNC: 24 MMOL/L (ref 21–32)
CREAT SERPL-MCNC: 0.6 MG/DL (ref 0.5–1.05)
EGFRCR SERPLBLD CKD-EPI 2021: >90 ML/MIN/1.73M*2
EOSINOPHIL # BLD AUTO: 0.06 X10*3/UL (ref 0–0.7)
EOSINOPHIL NFR BLD AUTO: 0.8 %
ERYTHROCYTE [DISTWIDTH] IN BLOOD BY AUTOMATED COUNT: 14.7 % (ref 11.5–14.5)
GLUCOSE SERPL-MCNC: 82 MG/DL (ref 74–99)
HCT VFR BLD AUTO: 30.2 % (ref 36–46)
HGB BLD-MCNC: 10.2 G/DL (ref 12–16)
IMM GRANULOCYTES # BLD AUTO: 0.04 X10*3/UL (ref 0–0.7)
IMM GRANULOCYTES NFR BLD AUTO: 0.5 % (ref 0–0.9)
LYMPHOCYTES # BLD AUTO: 3.23 X10*3/UL (ref 1.2–4.8)
LYMPHOCYTES NFR BLD AUTO: 42.7 %
MAGNESIUM SERPL-MCNC: 1.99 MG/DL (ref 1.6–2.4)
MCH RBC QN AUTO: 30.1 PG (ref 26–34)
MCHC RBC AUTO-ENTMCNC: 33.8 G/DL (ref 32–36)
MCV RBC AUTO: 89 FL (ref 80–100)
MONOCYTES # BLD AUTO: 0.57 X10*3/UL (ref 0.1–1)
MONOCYTES NFR BLD AUTO: 7.5 %
NEUTROPHILS # BLD AUTO: 3.62 X10*3/UL (ref 1.2–7.7)
NEUTROPHILS NFR BLD AUTO: 48 %
NRBC BLD-RTO: 0 /100 WBCS (ref 0–0)
PLATELET # BLD AUTO: 404 X10*3/UL (ref 150–450)
POTASSIUM SERPL-SCNC: 3 MMOL/L (ref 3.5–5.3)
PR INTERVAL: 120 MS
PROT SERPL-MCNC: 8.1 G/DL (ref 6.4–8.2)
Q ONSET: 221 MS
QRS COUNT: 21 BEATS
QRS DURATION: 76 MS
QT INTERVAL: 380 MS
QTC CALCULATION(BAZETT): 550 MS
QTC FREDERICIA: 487 MS
R AXIS: 61 DEGREES
RBC # BLD AUTO: 3.39 X10*6/UL (ref 4–5.2)
SODIUM SERPL-SCNC: 141 MMOL/L (ref 136–145)
T AXIS: -17 DEGREES
T OFFSET: 411 MS
VENTRICULAR RATE: 126 BPM
WBC # BLD AUTO: 7.6 X10*3/UL (ref 4.4–11.3)

## 2024-05-30 PROCEDURE — 4500999001 HC ED NO CHARGE

## 2024-05-30 PROCEDURE — 93005 ELECTROCARDIOGRAM TRACING: CPT

## 2024-05-30 PROCEDURE — 80053 COMPREHEN METABOLIC PANEL: CPT | Performed by: EMERGENCY MEDICINE

## 2024-05-30 PROCEDURE — 85025 COMPLETE CBC W/AUTO DIFF WBC: CPT | Performed by: EMERGENCY MEDICINE

## 2024-05-30 PROCEDURE — 36415 COLL VENOUS BLD VENIPUNCTURE: CPT | Performed by: EMERGENCY MEDICINE

## 2024-05-30 PROCEDURE — 83735 ASSAY OF MAGNESIUM: CPT | Performed by: EMERGENCY MEDICINE

## 2024-05-30 ASSESSMENT — COLUMBIA-SUICIDE SEVERITY RATING SCALE - C-SSRS
6. HAVE YOU EVER DONE ANYTHING, STARTED TO DO ANYTHING, OR PREPARED TO DO ANYTHING TO END YOUR LIFE?: NO
2. HAVE YOU ACTUALLY HAD ANY THOUGHTS OF KILLING YOURSELF?: NO
1. IN THE PAST MONTH, HAVE YOU WISHED YOU WERE DEAD OR WISHED YOU COULD GO TO SLEEP AND NOT WAKE UP?: NO

## 2024-05-30 NOTE — ED TRIAGE NOTES
Patient to ED with complaints of R sided pain, numbness and tingling. Reports it started 3 days ago with her fingers and has gotten progressively worse. Patient reports history of sinus tachycardia. Was admitted here on cardiac monitoring for several days per patient. Denies falls, back/ neck injury, and other complaints.

## 2024-06-04 ENCOUNTER — HOSPITAL ENCOUNTER (EMERGENCY)
Facility: HOSPITAL | Age: 39
Discharge: HOME | End: 2024-06-04
Payer: MEDICAID

## 2024-06-04 VITALS
OXYGEN SATURATION: 100 % | RESPIRATION RATE: 16 BRPM | TEMPERATURE: 97.7 F | WEIGHT: 119 LBS | DIASTOLIC BLOOD PRESSURE: 90 MMHG | SYSTOLIC BLOOD PRESSURE: 138 MMHG | HEART RATE: 125 BPM | HEIGHT: 61 IN | BODY MASS INDEX: 22.47 KG/M2

## 2024-06-04 PROCEDURE — 4500999001 HC ED NO CHARGE

## 2024-06-04 ASSESSMENT — PAIN DESCRIPTION - LOCATION: LOCATION: LEG

## 2024-06-04 ASSESSMENT — COLUMBIA-SUICIDE SEVERITY RATING SCALE - C-SSRS
1. IN THE PAST MONTH, HAVE YOU WISHED YOU WERE DEAD OR WISHED YOU COULD GO TO SLEEP AND NOT WAKE UP?: NO
2. HAVE YOU ACTUALLY HAD ANY THOUGHTS OF KILLING YOURSELF?: NO
6. HAVE YOU EVER DONE ANYTHING, STARTED TO DO ANYTHING, OR PREPARED TO DO ANYTHING TO END YOUR LIFE?: NO

## 2024-06-04 ASSESSMENT — LIFESTYLE VARIABLES
HAVE YOU EVER FELT YOU SHOULD CUT DOWN ON YOUR DRINKING: NO
EVER HAD A DRINK FIRST THING IN THE MORNING TO STEADY YOUR NERVES TO GET RID OF A HANGOVER: NO
EVER FELT BAD OR GUILTY ABOUT YOUR DRINKING: NO
TOTAL SCORE: 0
HAVE PEOPLE ANNOYED YOU BY CRITICIZING YOUR DRINKING: NO

## 2024-06-04 ASSESSMENT — PAIN DESCRIPTION - ORIENTATION: ORIENTATION: RIGHT

## 2024-06-04 ASSESSMENT — PAIN - FUNCTIONAL ASSESSMENT: PAIN_FUNCTIONAL_ASSESSMENT: 0-10

## 2024-06-04 ASSESSMENT — PAIN DESCRIPTION - DESCRIPTORS
DESCRIPTORS: ACHING;NUMBNESS
DESCRIPTORS: ACHING

## 2024-06-04 ASSESSMENT — PAIN SCALES - GENERAL: PAINLEVEL_OUTOF10: 8

## 2024-06-04 ASSESSMENT — PAIN DESCRIPTION - PAIN TYPE: TYPE: ACUTE PAIN

## 2024-06-04 NOTE — ED TRIAGE NOTES
Pt reports 8/10 right leg/ foot pain and numbness that she has had for 2 weeks. She also reports bumps on her left palm

## 2024-08-12 ENCOUNTER — OFFICE VISIT (OUTPATIENT)
Dept: PRIMARY CARE | Facility: CLINIC | Age: 39
End: 2024-08-12
Payer: MEDICAID

## 2024-08-12 VITALS
WEIGHT: 114.4 LBS | HEIGHT: 61 IN | BODY MASS INDEX: 21.6 KG/M2 | TEMPERATURE: 97.7 F | SYSTOLIC BLOOD PRESSURE: 125 MMHG | DIASTOLIC BLOOD PRESSURE: 84 MMHG | HEART RATE: 123 BPM

## 2024-08-12 DIAGNOSIS — E87.6 HYPOKALEMIA: ICD-10-CM

## 2024-08-12 DIAGNOSIS — Z76.89 ENCOUNTER TO ESTABLISH CARE: ICD-10-CM

## 2024-08-12 DIAGNOSIS — R00.0 TACHYCARDIA: ICD-10-CM

## 2024-08-12 DIAGNOSIS — N94.6 DYSMENORRHEA: Primary | ICD-10-CM

## 2024-08-12 DIAGNOSIS — F17.210 SMOKING GREATER THAN 20 PACK YEARS: ICD-10-CM

## 2024-08-12 DIAGNOSIS — M54.12 CERVICAL RADICULOPATHY: ICD-10-CM

## 2024-08-12 DIAGNOSIS — J66.2 CANNABINOSIS (MULTI): ICD-10-CM

## 2024-08-12 DIAGNOSIS — D72.829 LEUKOCYTOSIS, UNSPECIFIED TYPE: ICD-10-CM

## 2024-08-12 DIAGNOSIS — F51.01 PRIMARY INSOMNIA: ICD-10-CM

## 2024-08-12 PROCEDURE — 99203 OFFICE O/P NEW LOW 30 MIN: CPT | Performed by: FAMILY MEDICINE

## 2024-08-12 PROCEDURE — 3008F BODY MASS INDEX DOCD: CPT | Performed by: FAMILY MEDICINE

## 2024-08-12 NOTE — PROGRESS NOTES
This is a 39-year-old female patient here to establish care    Her main complaint today is severe dysmenorrhea along with vomiting this has been ongoing for years    When she went to the emergency room since she was throwing up she was told she has fibroids and since she does not have a PCP she was referred here    Since she has vomiting and also she was rehydrated    Her white blood count was high also today I noticed that she is tachycardic    She says her heart rate used to be high was seen by [facility] some years ago and they suspected hide thyroid    Advised patient to get her thyroid levels checked again I ordered it and she promised to get it done sometime Thursday or Friday when she is off work    I also gave a lot of directions and the reasons for certain referrals such as psychologist psychiatrist and gynecologist she promised to follow through and also work on her habit of cannabis    She says she will keep her appointment for November for her annual physical

## 2024-08-12 NOTE — PATIENT INSTRUCTIONS
Welcome to our practice    The Journey that we are  to starting  today is educating you so that you will understand how to take care of your body to become healthy    You mentioned that you are unable to sleep that your mind is racing.  Lack of proper sleep can lead you to have various illnesses    Lack of sleep could be due to depression bipolar depression or anxiety    Instead of me giving you medicine I want you to be evaluated by a psychiatrist and a psychologist belonging to [facility] [place]      When I checked your labs that were done at [facility] I noticed your white blood count was high and that shows that there is a lot of damage done inside your body    Can be due to lack of sleep    Can be due to unhealthy eating habits    Can be due to smoking cannabis    Again lack of sleep lead you to have various illnesses even tempting you to smoke.,  Tempting you to eat unhealthy so therefore very important again I am stressing the fact we have to get your mind together to help your body        So do not forget to keep your psychology and psychiatric appointment      You were told by the emergency room that you have fibroids and I mention the ultrasound results are not impressive    But with each menstrual cycle you are going through a lot of pain and throwing up and that can be due to endometriosis and I have placed a referral for you to see the gynecologist      Right arm numbness that starting from your neck downwards could be due to pinched nerve and it can get aggravated when you are reaching for things over your shoulder but out of all the list that I have listed for you that is the least important thing for for us to talk about further but as we go along we will talk about that as well        Mental health is a big issue that is the #1 problem    Painful menstrual and throwing up with your each menstrual cycle is the #2 problem    And as I mentioned above your white count is high that indicates  that you have a lot of damage to your cells your tissues therefore we need to get your health to get the ASAP  I am trying to guide you to lead a healthy life this is what we are starting to do today and I hope you understand how I am going to work with you    Also I forgot to mention your potassium was low it could be due to bad eating habits    I always tell my patients there are 3 habits that you need to develop on a day-to-day basis for you to be healthy and those are mentioned below    1.  Every day sleep  at night or rest ( some days we are unable to sleep )around the same time without the TV-this is important habit to reduce dementia and aging prematurely    2.  Eat 3 cups of green leafy vegetables daily include at least 1 fruit.,  Reduce animal protein consumption-/ also  Starch  consumption  --this will help to lose weight avoid illnesses such as dementia high blood pressure cancer.,  Diabetes    3.  Exercise including aerobics as well as resistant exercise for at least 45 minutes a day for 5 days this will also help to reduce premature aging       I like you to come back in 3 months and I will tell my staff to schedule you for a full physical exam    And that will be a 45-minute appointment please do not forget to keep that appointment if you do forget you are wasting 45 minutes

## 2024-08-30 ENCOUNTER — APPOINTMENT (OUTPATIENT)
Dept: OBSTETRICS AND GYNECOLOGY | Facility: CLINIC | Age: 39
End: 2024-08-30
Payer: MEDICAID

## 2024-09-13 ENCOUNTER — APPOINTMENT (OUTPATIENT)
Dept: OBSTETRICS AND GYNECOLOGY | Facility: CLINIC | Age: 39
End: 2024-09-13
Payer: MEDICAID

## 2024-11-13 ENCOUNTER — APPOINTMENT (OUTPATIENT)
Dept: PRIMARY CARE | Facility: CLINIC | Age: 39
End: 2024-11-13
Payer: MEDICAID

## 2024-11-22 ENCOUNTER — APPOINTMENT (OUTPATIENT)
Dept: OBSTETRICS AND GYNECOLOGY | Facility: CLINIC | Age: 39
End: 2024-11-22
Payer: MEDICAID

## 2024-11-22 VITALS
BODY MASS INDEX: 21.52 KG/M2 | WEIGHT: 114 LBS | DIASTOLIC BLOOD PRESSURE: 84 MMHG | SYSTOLIC BLOOD PRESSURE: 118 MMHG | HEIGHT: 61 IN

## 2024-11-22 DIAGNOSIS — N94.6 DYSMENORRHEA: ICD-10-CM

## 2024-11-22 DIAGNOSIS — D25.9 UTERINE LEIOMYOMA, UNSPECIFIED LOCATION: ICD-10-CM

## 2024-11-22 PROCEDURE — 99203 OFFICE O/P NEW LOW 30 MIN: CPT | Performed by: OBSTETRICS & GYNECOLOGY

## 2024-11-22 PROCEDURE — 3008F BODY MASS INDEX DOCD: CPT | Performed by: OBSTETRICS & GYNECOLOGY

## 2024-11-22 PROCEDURE — 4004F PT TOBACCO SCREEN RCVD TLK: CPT | Performed by: OBSTETRICS & GYNECOLOGY

## 2024-11-22 RX ORDER — NAPROXEN 500 MG/1
TABLET ORAL
COMMUNITY
Start: 2024-08-12

## 2024-11-22 RX ORDER — NORETHINDRONE ACETATE AND ETHINYL ESTRADIOL 1MG-20(21)
1 KIT ORAL DAILY
Qty: 28 TABLET | Refills: 5 | Status: SHIPPED | OUTPATIENT
Start: 2024-11-22

## 2024-11-22 NOTE — PROGRESS NOTES
Subjective   Patient ID: Simi Mcnair is a 39 y.o. female who presents for Fibroids (New Patient//C/o  painful heavy periods/- ultrasound 8/2024 showed fibroids//Discuss options/Chaperone declined).  HPI  US pelvis limited  Order: [id number]  Impression    IMPRESSION:  1. No evidence of ovarian torsion.  2. Fibroid uterus, one of which is submucosal/intracavitary.                Transcribed Using Voice Recognition  Transcribe Date/Time: Aug 12 2024  4:28A    Dictated by: QI ELENA MD    This examination was interpreted and the report reviewed and  electronically signed by:  QI ELENA MD on Aug 12 2024  4:38AM  EST  Narrative    * * *Final Report* * *    DATE OF EXAM: Aug 12 2024  3:50AM      SPU   1059  -  US FEMALE PELVIS TRANSABD  LTD  / ACCESSION #  [id number]    PROCEDURE REASON: Ovarian torsion        * * * * Physician Interpretation * * * *    RESULT: EXAM: US FEMALE PELVIS TRANSABD  LTD, US DOPPLER LTD, US FEMALE  PELVIS TRANSVAG  INDICATION:   Ovarian torsion, abdominal cramping, nausea/vomiting    COMPARISON: 08/12/2024 CT  TECHNIQUE: Grayscale transverse and sagittal transabdominal and  transvaginal images were obtained of the pelvis.  Transvaginal images  were necessary to better assess anatomic detail.  The ovaries were  examined with grayscale, color Doppler, and spectral waveform analysis.    FINDINGS:    Uterus  Orientation: Anteverted  Size: 8 x 4.4 x 5.3 cm  Mass: Uterine fibroids, largest 4.8 x 4.4 x 3.7 cm (posterior subserosal)  and approximately 3.5 x 2.1 x 2.1 cm (submucosal/intracavitary)  Cervix: Unremarkable.    Endometrium:  Thickness: Partially obscured by submucosal/intracavitary fibroid  extension.    Right ovary:  Size: 3.7 x 1.8 x 2.1 cm  Mass/Cyst: None  Other: Normal sonographic appearance.  Vascularity: Normal venous and arterial color flow and waveforms.    Left ovary:  Size: 4 x 2.6 x 2.5 cm  Mass/Cyst: None  Other: Normal sonographic appearance.  Vascularity: Normal  venous and arterial color flow and waveforms.    Free fluid: No free fluid.  Exam End: 08/12/24 03:50    Specimen Collected: 08/12/24 03:50 Last Resulted: 08/12/24 04:40   Received From: [facility]  Result Received: 08/12/24 14:36      Review of Systems  10 systems have been reviewed and are negative and noncontributory to the patient current ailments.    Objective   Physical Exam  Vital signs reviewed    CONSTITUTIONAL- well nourished, well developed, looks like stated age.  She is sitting comfortably on the exam table in no apparent distress    ABDOMEN- soft, non distended, bowel sound normal pitch and intensity,no palpable abnormal masses  GENITOURINARY- External genitalia: Normal.                                 - Uterus: AV/AF due to  lower segment fibroid change, mobile and nontender                                -The adnexal areas are free of tenderness or mass                                -Menstrual-like flow present  Assessment/Plan   Diagnoses and all orders for this visit:  Dysmenorrhea  -     Referral to Gynecology  Uterine leiomyoma, unspecified location  Educated on uterine fibroid changes.  We discussed treatment options both medical and surgical.  Will trial a combination birth control pill for control of cycles and ovarian suppression.  -Recheck ultrasound in 6 months.         Joe Wilson DO 11/22/24 3:18 PM

## 2025-01-16 ENCOUNTER — TELEPHONE (OUTPATIENT)
Dept: OBSTETRICS AND GYNECOLOGY | Facility: CLINIC | Age: 40
End: 2025-01-16
Payer: MEDICAID

## 2025-01-16 NOTE — TELEPHONE ENCOUNTER
Patient would like to discuss her last visit with you. States she was seen and admitted into ER and had multiple blood transfusions and had further questions.    yes

## 2025-05-05 ENCOUNTER — HOSPITAL ENCOUNTER (EMERGENCY)
Facility: HOSPITAL | Age: 40
Discharge: HOME | End: 2025-05-06
Payer: MEDICAID

## 2025-05-05 ENCOUNTER — APPOINTMENT (OUTPATIENT)
Dept: CARDIOLOGY | Facility: HOSPITAL | Age: 40
End: 2025-05-05
Payer: MEDICAID

## 2025-05-05 ENCOUNTER — APPOINTMENT (OUTPATIENT)
Dept: OBSTETRICS AND GYNECOLOGY | Facility: CLINIC | Age: 40
End: 2025-05-05
Payer: MEDICAID

## 2025-05-05 DIAGNOSIS — E87.6 HYPOKALEMIA: Primary | ICD-10-CM

## 2025-05-05 DIAGNOSIS — R10.84 ABDOMINAL PAIN, GENERALIZED: ICD-10-CM

## 2025-05-05 DIAGNOSIS — N30.90 CYSTITIS: ICD-10-CM

## 2025-05-05 LAB
ALBUMIN SERPL BCP-MCNC: 4.4 G/DL (ref 3.4–5)
ALP SERPL-CCNC: 38 U/L (ref 33–110)
ALT SERPL W P-5'-P-CCNC: 10 U/L (ref 7–45)
ANION GAP SERPL CALC-SCNC: 16 MMOL/L (ref 10–20)
APPEARANCE UR: CLEAR
AST SERPL W P-5'-P-CCNC: 20 U/L (ref 9–39)
BASOPHILS # BLD AUTO: 0.02 X10*3/UL (ref 0–0.1)
BASOPHILS NFR BLD AUTO: 0.3 %
BILIRUB SERPL-MCNC: 0.4 MG/DL (ref 0–1.2)
BILIRUB UR STRIP.AUTO-MCNC: NEGATIVE MG/DL
BUN SERPL-MCNC: 8 MG/DL (ref 6–23)
CALCIUM SERPL-MCNC: 9.5 MG/DL (ref 8.6–10.3)
CHLORIDE SERPL-SCNC: 107 MMOL/L (ref 98–107)
CO2 SERPL-SCNC: 21 MMOL/L (ref 21–32)
COLOR UR: COLORLESS
CREAT SERPL-MCNC: 0.52 MG/DL (ref 0.5–1.05)
EGFRCR SERPLBLD CKD-EPI 2021: >90 ML/MIN/1.73M*2
EOSINOPHIL # BLD AUTO: 0.04 X10*3/UL (ref 0–0.7)
EOSINOPHIL NFR BLD AUTO: 0.6 %
ERYTHROCYTE [DISTWIDTH] IN BLOOD BY AUTOMATED COUNT: 17.9 % (ref 11.5–14.5)
GLUCOSE SERPL-MCNC: 83 MG/DL (ref 74–99)
GLUCOSE UR STRIP.AUTO-MCNC: NORMAL MG/DL
HCT VFR BLD AUTO: 34.7 % (ref 36–46)
HGB BLD-MCNC: 11.6 G/DL (ref 12–16)
IMM GRANULOCYTES # BLD AUTO: 0.11 X10*3/UL (ref 0–0.7)
IMM GRANULOCYTES NFR BLD AUTO: 1.6 % (ref 0–0.9)
KETONES UR STRIP.AUTO-MCNC: NEGATIVE MG/DL
LACTATE SERPL-SCNC: 1.5 MMOL/L (ref 0.4–2)
LEUKOCYTE ESTERASE UR QL STRIP.AUTO: ABNORMAL
LIPASE SERPL-CCNC: 30 U/L (ref 9–82)
LYMPHOCYTES # BLD AUTO: 3.5 X10*3/UL (ref 1.2–4.8)
LYMPHOCYTES NFR BLD AUTO: 51.4 %
MCH RBC QN AUTO: 29.1 PG (ref 26–34)
MCHC RBC AUTO-ENTMCNC: 33.4 G/DL (ref 32–36)
MCV RBC AUTO: 87 FL (ref 80–100)
MONOCYTES # BLD AUTO: 0.62 X10*3/UL (ref 0.1–1)
MONOCYTES NFR BLD AUTO: 9.1 %
NEUTROPHILS # BLD AUTO: 2.52 X10*3/UL (ref 1.2–7.7)
NEUTROPHILS NFR BLD AUTO: 37 %
NITRITE UR QL STRIP.AUTO: NEGATIVE
NRBC BLD-RTO: 0 /100 WBCS (ref 0–0)
PH UR STRIP.AUTO: 6 [PH]
PLATELET # BLD AUTO: 427 X10*3/UL (ref 150–450)
POTASSIUM SERPL-SCNC: 3.1 MMOL/L (ref 3.5–5.3)
PROT SERPL-MCNC: 7.6 G/DL (ref 6.4–8.2)
PROT UR STRIP.AUTO-MCNC: NEGATIVE MG/DL
RBC # BLD AUTO: 3.99 X10*6/UL (ref 4–5.2)
RBC # UR STRIP.AUTO: NEGATIVE MG/DL
RBC #/AREA URNS AUTO: NORMAL /HPF
SODIUM SERPL-SCNC: 141 MMOL/L (ref 136–145)
SP GR UR STRIP.AUTO: 1
SQUAMOUS #/AREA URNS AUTO: NORMAL /HPF
UROBILINOGEN UR STRIP.AUTO-MCNC: NORMAL MG/DL
WBC # BLD AUTO: 6.8 X10*3/UL (ref 4.4–11.3)
WBC #/AREA URNS AUTO: NORMAL /HPF

## 2025-05-05 PROCEDURE — 87086 URINE CULTURE/COLONY COUNT: CPT | Mod: AHULAB | Performed by: NURSE PRACTITIONER

## 2025-05-05 PROCEDURE — 36415 COLL VENOUS BLD VENIPUNCTURE: CPT | Performed by: NURSE PRACTITIONER

## 2025-05-05 PROCEDURE — 93005 ELECTROCARDIOGRAM TRACING: CPT

## 2025-05-05 PROCEDURE — 80053 COMPREHEN METABOLIC PANEL: CPT | Performed by: NURSE PRACTITIONER

## 2025-05-05 PROCEDURE — 87040 BLOOD CULTURE FOR BACTERIA: CPT | Mod: AHULAB | Performed by: NURSE PRACTITIONER

## 2025-05-05 PROCEDURE — 83605 ASSAY OF LACTIC ACID: CPT | Performed by: NURSE PRACTITIONER

## 2025-05-05 PROCEDURE — 99285 EMERGENCY DEPT VISIT HI MDM: CPT | Mod: 25

## 2025-05-05 PROCEDURE — 81001 URINALYSIS AUTO W/SCOPE: CPT | Performed by: NURSE PRACTITIONER

## 2025-05-05 PROCEDURE — 85025 COMPLETE CBC W/AUTO DIFF WBC: CPT | Performed by: NURSE PRACTITIONER

## 2025-05-05 PROCEDURE — 84702 CHORIONIC GONADOTROPIN TEST: CPT | Performed by: SURGERY

## 2025-05-05 PROCEDURE — 83690 ASSAY OF LIPASE: CPT | Performed by: NURSE PRACTITIONER

## 2025-05-05 ASSESSMENT — PAIN - FUNCTIONAL ASSESSMENT: PAIN_FUNCTIONAL_ASSESSMENT: 0-10

## 2025-05-05 ASSESSMENT — PAIN SCALES - GENERAL: PAINLEVEL_OUTOF10: 5 - MODERATE PAIN

## 2025-05-05 ASSESSMENT — PAIN DESCRIPTION - LOCATION: LOCATION: ABDOMEN

## 2025-05-05 NOTE — Clinical Note
Simi Mcnair was seen and treated in our emergency department on 5/5/2025.  She may return to work on 05/17/2025.       If you have any questions or concerns, please don't hesitate to call.      Daryl Jnenings PA-C

## 2025-05-05 NOTE — Clinical Note
Simi Mcnair was seen and treated in our emergency department on 5/5/2025.  She may return to work on 05/08/2025.       If you have any questions or concerns, please don't hesitate to call.      Drake Mayo PA-C

## 2025-05-06 ENCOUNTER — APPOINTMENT (OUTPATIENT)
Dept: RADIOLOGY | Facility: HOSPITAL | Age: 40
End: 2025-05-06
Payer: MEDICAID

## 2025-05-06 VITALS
WEIGHT: 120 LBS | HEART RATE: 102 BPM | DIASTOLIC BLOOD PRESSURE: 87 MMHG | HEIGHT: 61 IN | OXYGEN SATURATION: 97 % | BODY MASS INDEX: 22.66 KG/M2 | RESPIRATION RATE: 20 BRPM | SYSTOLIC BLOOD PRESSURE: 110 MMHG | TEMPERATURE: 97.7 F

## 2025-05-06 LAB — B-HCG SERPL-ACNC: <2 MIU/ML

## 2025-05-06 PROCEDURE — 2500000004 HC RX 250 GENERAL PHARMACY W/ HCPCS (ALT 636 FOR OP/ED): Performed by: SURGERY

## 2025-05-06 PROCEDURE — 2500000001 HC RX 250 WO HCPCS SELF ADMINISTERED DRUGS (ALT 637 FOR MEDICARE OP): Performed by: SURGERY

## 2025-05-06 PROCEDURE — 74177 CT ABD & PELVIS W/CONTRAST: CPT

## 2025-05-06 PROCEDURE — 96374 THER/PROPH/DIAG INJ IV PUSH: CPT | Mod: 59

## 2025-05-06 PROCEDURE — 2550000001 HC RX 255 CONTRASTS: Performed by: SURGERY

## 2025-05-06 PROCEDURE — 96375 TX/PRO/DX INJ NEW DRUG ADDON: CPT

## 2025-05-06 PROCEDURE — 74177 CT ABD & PELVIS W/CONTRAST: CPT | Performed by: STUDENT IN AN ORGANIZED HEALTH CARE EDUCATION/TRAINING PROGRAM

## 2025-05-06 RX ORDER — IBUPROFEN 600 MG/1
600 TABLET ORAL EVERY 6 HOURS PRN
Qty: 28 TABLET | Refills: 0 | Status: SHIPPED | OUTPATIENT
Start: 2025-05-06 | End: 2025-05-13

## 2025-05-06 RX ORDER — ONDANSETRON HYDROCHLORIDE 2 MG/ML
4 INJECTION, SOLUTION INTRAVENOUS ONCE
Status: COMPLETED | OUTPATIENT
Start: 2025-05-06 | End: 2025-05-06

## 2025-05-06 RX ORDER — POTASSIUM CHLORIDE 1.5 G/1.58G
40 POWDER, FOR SOLUTION ORAL ONCE
Status: COMPLETED | OUTPATIENT
Start: 2025-05-06 | End: 2025-05-06

## 2025-05-06 RX ORDER — KETOROLAC TROMETHAMINE 30 MG/ML
15 INJECTION, SOLUTION INTRAMUSCULAR; INTRAVENOUS ONCE
Status: COMPLETED | OUTPATIENT
Start: 2025-05-06 | End: 2025-05-06

## 2025-05-06 RX ORDER — CEPHALEXIN 500 MG/1
500 CAPSULE ORAL 3 TIMES DAILY
Qty: 15 CAPSULE | Refills: 0 | Status: SHIPPED | OUTPATIENT
Start: 2025-05-06 | End: 2025-05-06

## 2025-05-06 RX ADMIN — POTASSIUM CHLORIDE 40 MEQ: 1.5 POWDER, FOR SOLUTION ORAL at 01:23

## 2025-05-06 RX ADMIN — IOHEXOL 75 ML: 350 INJECTION, SOLUTION INTRAVENOUS at 01:06

## 2025-05-06 RX ADMIN — ONDANSETRON 4 MG: 2 INJECTION, SOLUTION INTRAMUSCULAR; INTRAVENOUS at 01:22

## 2025-05-06 RX ADMIN — KETOROLAC TROMETHAMINE 15 MG: 30 INJECTION, SOLUTION INTRAMUSCULAR at 01:21

## 2025-05-06 NOTE — ED TRIAGE NOTES
Secondary to patient volumes and overcrowding, I performed a brief medical screening exam of the patient in triage, as the patient awaits space in the main ED.    History of Present Illness:  Simi Mcnair presents with   Chief Complaint   Patient presents with    Abdominal Pain       Physical Exam:  General - In no acute distress  Respiratory - Breathing comfortably  Cardiac -tachycardia, normal S1, S2, no m/g/r  Neurologic - Moving all extremities  Abdomen -bowel sounds present, no CVAT, right lower quadrant pain    Medical Decision Making:  Patient will require further evaluation in the main ED.    Initial diagnostic tests were ordered from triage.      The patient demonstrates understanding that this initial evaluation is a brief medical screening exam and the expectation is that they await for space in the main ED to be further evaluated.  The patient understands that, if they leave prior to further evaluation in the main ED after this initial evaluation in triage, they are doing so under their own accord knowing that their evaluation/work-up is not yet complete. The patient also understands that any preliminary diagnostic results, including abnormalities, may not be shared with them, if they choose to leave prior to further evaluation in the main ED.

## 2025-05-06 NOTE — ED PROVIDER NOTES
Chief Complaint   Patient presents with    Abdominal Pain     HPI:   Simi Mcnair is an 39 y.o. female presenting to the ED for chief complaint of abdominal pain.  Patient explains that on February 7 she had a laparoscopic hysterectomy and explains she recently started going back to work.  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  Explains that today at work she did a lot of stairs and afterwards she developed lower abdominal pain that is localized to the right adnexal area.  She explains the pain is relieved when she flexes her hip up.  Explains her last bowel movement was 2 days ago and was hard and like a pebble.  Her last bowel movement before that was 2 days before was normal.    RX Allergies[1]:  Medical History[2]  Surgical History[3]  Family History[4]     Physical Exam  Vitals and nursing note reviewed.   Constitutional:       General: She is not in acute distress.     Appearance: She is well-developed.   HENT:      Head: Normocephalic and atraumatic.   Eyes:      Conjunctiva/sclera: Conjunctivae normal.   Cardiovascular:      Rate and Rhythm: Normal rate and regular rhythm.      Heart sounds: No murmur heard.  Pulmonary:      Effort: Pulmonary effort is normal. No respiratory distress.      Breath sounds: Normal breath sounds.   Abdominal:      Palpations: Abdomen is soft.      Tenderness: There is abdominal tenderness in the right lower quadrant.   Musculoskeletal:         General: No swelling.      Cervical back: Neck supple.   Skin:     General: Skin is warm and dry.      Capillary Refill: Capillary refill takes less than 2 seconds.   Neurological:      Mental Status: She is alert.   Psychiatric:         Mood and Affect: Mood normal.        VS: As documented in the triage note and EMR flowsheet from this visit were reviewed.      Medical Decision Making: This is a 39-year-old female presenting to the ED for chief complaint of lower abdominal pain that began after she started working after her  laparoscopic hysterectomy.  She had surgery on February 6 explains she recently started back at work.  On my examination the patient has stable vitals.  She is speaking full sentences.  She is a good historian.  Abdomen is without peritoneal signs.  Her incisions are well-healed and not erythematous and swollen or draining.  She had tenderness over the right anterior hip flexor and reported a snapping sensation when she flexed her hip however explained that it did provide her with some relief as well.  The joint moves smoothly and freely.  I did appreciate the snapping on exam raising concern for possible snapping hip syndrome.  Differential also includes ovarian torsion, bowel obstruction, and normal postop pain.  She does not have concerns for STIs.  Her labs are reassuring.  She had no leukocytosis.  She had a normocytic anemia with a stable hemoglobin of 11.6.  Her potassium was slightly decreased at 3.1 and was replenished with oral potassium.  Patient was tolerating p.o. throughout ED course.  Lipase was normal limits.  Urinalysis showed 1-5 white blood cells and 250 leukocytes however there are no nitrites.  Keflex was sent to her pharmacy and concern for potential cystitis.  Patient was discharged in stable condition.  Did offer an x-ray of her foot that is been causing her significant chronic pain however she declined and will follow-up with orthopedic walk-in clinic.  Recommend she continue her MiraLAX and stool softeners daily.  She was discharged in stable condition to follow-up with her surgeon.  She was provided a referral to establish care with the PCP.      Diagnoses as of 05/06/25 0331   Hypokalemia   Abdominal pain, generalized     Counseling: Spoke with the patient and discussed today´s findings, in addition to providing specific details for the plan of care and expected course.  Patient was given the opportunity to ask questions.    Discussed return precautions and importance of  follow-up.  Advised to follow-up with PCP.  I specifically advised to return to the ED for changing or worsening symptoms, new symptoms, complaint specific precautions, and precautions listed on the discharge paperwork.  Educated on the common potential side effects of medications prescribed.    I advised the patient that the emergency evaluation and treatment provided today doesn't end their need for medical care. It is very important that they follow-up with their primary care provider or other specialist as instructed.    The plan of care was mutually agreed upon with the patient. The patient and/or family were given the opportunity to ask questions. All questions asked today in the ED were answered to the best of my ability with today's information.    This report was transcribed using voice recognition software.  Every effort was made to ensure accuracy, however, inadvertently computerized transcription errors may be present.         [1]   Allergies  Allergen Reactions    Amoxicillin Unknown and Hives    Sulfa (Sulfonamide Antibiotics) Hives    Sulfamethoxazole-Trimethoprim Hives   [2]   Past Medical History:  Diagnosis Date    Anxiety     Chronic bronchitis (Multi)     Hypertension     Sinus tachycardia    [3]   Past Surgical History:  Procedure Laterality Date    BARTHOLIN GLAND CYST EXCISION      BILATERAL SALPINGOOPHORECTOMY  02/2025    at [facility]    BUNIONECTOMY      LAPAROSCOPIC HYSTERECTOMY  02/2025    at [facility]   [4] No family history on file.       Drake Mayo PA-C  05/06/25 0414

## 2025-05-06 NOTE — ED TRIAGE NOTES
Pt to ED with complaint of lower abdominal pain radiating into the groin. States had hysterectomy and has had complications ever since. Denies any urinary issues. Denies CP/SOB. Arrives a/o x4 and ambulatory.

## 2025-05-06 NOTE — DISCHARGE INSTRUCTIONS
You have been seen at a [facility] facility.  Your blood work was reassuring.  You had a CT that was reassuring.  Your pain is likely due to your hip flexor muscle.  Please use ibuprofen for any discomfort.  Continue to use MiraLAX and stool softeners daily.  Be sure to drink plenty of water.  Your potassium was low -please try to increase potassium in your diet.  Please follow-up with your primary care provider in the next 1 to 2 days for further evaluation and routine follow-up.  Please return to the emergency room if having any worsening symptoms.  Please remember to follow-up with your surgeon for routine care.     Please follow-up at the  orthopedic injury clinic at [facility] [place]  [address]  [address]  [place] [address]  [phone]  Open for walk-ins Monday through Friday 8:30 AM through 4 PM

## 2025-05-07 LAB — BACTERIA UR CULT: NORMAL

## 2025-05-08 ENCOUNTER — APPOINTMENT (OUTPATIENT)
Dept: ORTHOPEDIC SURGERY | Facility: HOSPITAL | Age: 40
End: 2025-05-08
Payer: MEDICAID

## 2025-05-09 ENCOUNTER — OFFICE VISIT (OUTPATIENT)
Dept: ORTHOPEDIC SURGERY | Facility: HOSPITAL | Age: 40
End: 2025-05-09
Payer: MEDICAID

## 2025-05-09 ENCOUNTER — HOSPITAL ENCOUNTER (OUTPATIENT)
Dept: RADIOLOGY | Facility: HOSPITAL | Age: 40
Discharge: HOME | End: 2025-05-09
Payer: MEDICAID

## 2025-05-09 DIAGNOSIS — M25.551 HIP PAIN, ACUTE, RIGHT: ICD-10-CM

## 2025-05-09 DIAGNOSIS — M25.751 FEMOROACETABULAR IMPINGEMENT WITH OSTEOPHYTE OF RIGHT HIP: ICD-10-CM

## 2025-05-09 DIAGNOSIS — M25.551 HIP PAIN, ACUTE, RIGHT: Primary | ICD-10-CM

## 2025-05-09 DIAGNOSIS — M25.851 FEMOROACETABULAR IMPINGEMENT WITH OSTEOPHYTE OF RIGHT HIP: ICD-10-CM

## 2025-05-09 LAB
ATRIAL RATE: 122 BPM
P OFFSET: 176 MS
P ONSET: 137 MS
PR INTERVAL: 168 MS
Q ONSET: 221 MS
QRS COUNT: 20 BEATS
QRS DURATION: 72 MS
QT INTERVAL: 294 MS
QTC CALCULATION(BAZETT): 418 MS
QTC FREDERICIA: 372 MS
R AXIS: 60 DEGREES
T AXIS: -31 DEGREES
T OFFSET: 368 MS
VENTRICULAR RATE: 122 BPM

## 2025-05-09 PROCEDURE — 99214 OFFICE O/P EST MOD 30 MIN: CPT | Performed by: PHYSICIAN ASSISTANT

## 2025-05-09 PROCEDURE — 99204 OFFICE O/P NEW MOD 45 MIN: CPT | Performed by: PHYSICIAN ASSISTANT

## 2025-05-09 PROCEDURE — 73502 X-RAY EXAM HIP UNI 2-3 VIEWS: CPT | Mod: RT

## 2025-05-09 RX ORDER — CEPHALEXIN 500 MG/1
500 CAPSULE ORAL 2 TIMES DAILY
Qty: 14 CAPSULE | Refills: 0 | Status: SHIPPED | OUTPATIENT
Start: 2025-05-09 | End: 2025-05-16

## 2025-05-09 NOTE — PROGRESS NOTES
"Simi Mcnair is a 39 y.o. female on day 0 of admission presenting with No Principal Problem: There is no principal problem currently on the Problem List. Please update the Problem List and refresh..    Subjective   Patient calling in requesting antibiotic that was supposed to be prescribed for cystitis but not at the pharmacy when she went to pick it up.       Objective   I did not see or evaluate this patient.  Patient called and I did evaluate treating provider's note that stated she prescribed patient Keflex for cystitis.  I did not see that this was ordered and sent to the patient's pharmacy as noted.  I did put in the order for Keflex 500 mg twice daily x 7 days.  Patient does have an allergy to amoxicillin.  I did discuss with the patient that Keflex is a in the family of penicillin and patient denies allergy to Keflex.  She reports her allergy was to amoxicillin when she was a kid and believes she is able to tolerate Keflex.  She is advised to use Benadryl if needed and return to the emergency department if allergic reaction to medication.    Physical Exam    Last Recorded Vitals  Blood pressure 110/87, pulse (!) 102, temperature 36.5 °C (97.7 °F), resp. rate 20, height 1.549 m (5' 1\"), weight 54.4 kg (120 lb), SpO2 97%.  Intake/Output last 3 Shifts:  No intake/output data recorded.    Relevant Results                              Assessment & Plan  Hypokalemia    Abdominal pain, generalized    Cystitis     I did not see that this was ordered and sent to the patient's pharmacy as noted.  I did put in the order for Keflex 500 mg twice daily x 7 days.  Patient does have an allergy to amoxicillin.  I did discuss with the patient that Keflex is a in the family of penicillin and patient denies allergy to Keflex.  She reports her allergy was to amoxicillin when she was a kid and believes she is able to tolerate Keflex.  She is advised to use Benadryl if needed and return to the emergency department if allergic " reaction to medication.      I spent 5 minutes in the professional and overall care of this patient.      Eden Redd, MELVA-CNP

## 2025-05-09 NOTE — LETTER
May 9, 2025     Patient: Simi Mcnair   YOB: 1985   Date of Visit: 5/9/2025       To Whom It May Concern:    It is my medical opinion that Simi Mcnair may return to light duty immediately with the following restrictions: no lifting over 10lbs, no bending, stairs. No walking or standing for over 15 minutes. Estimated return to full duty without restrictions is  7/1/25    If you have any questions or concerns, please don't hesitate to call.         Sincerely,        Clif Barbosa PA-C    CC: No Recipients

## 2025-05-09 NOTE — PATIENT INSTRUCTIONS
Patient should avoid deep flexion of the knee including kneeling, squatting or sitting in low chairs.  They should also avoid impact activities such as running and jumping but can use a stationary bike, pool exercises and upper body training.    1. Follow stretching exercises that were on a separate handout   2. Hold each stretch for a least 1 minute  3. Do not bounce while stretching  4. Stretch for 10 minutes at a time, 3x a day for 6 weeks then daily  5. Remember, it takes several weeks to a few months of consistent stretching to increase flexibility and decrease symptoms.     You can use OTC Voltaren gel or aspercream and apply it to the injured area.    Ice and elevate supported at the calf with no pressure on the heel to reduce swelling.    The patient was given a prescription for physical therapy.  Physical therapy is medically necessary to improve strength, balance, range of motion and functional outcomes after injury and/or surgery.    Follow up with hip specialist as needed

## 2025-05-10 LAB
BACTERIA BLD CULT: NORMAL
BACTERIA BLD CULT: NORMAL

## 2025-05-13 NOTE — PROGRESS NOTES
HPI    NPV-   History of Present Illness  39 y.o.female here for right hip pain  1. Hip pain, acute, right  XR hip right with pelvis when performed 2 or 3 views      2. Femoroacetabular impingement with osteophyte of right hip  Referral to Physical Therapy        Mechanism of injury: none, working  Date of Injury/Pain: ~ 2 weeks  Location of pain: lateral hip  Frequency of Pain: worse with walking  Associated symptoms? Swelling.  Previous treatment?  none        Medical History[1]    Surgical History[2]    Social History     Socioeconomic History    Marital status: Single     Spouse name: Not on file    Number of children: 0    Years of education: Not on file    Highest education level: Not on file   Occupational History    Not on file   Tobacco Use    Smoking status: Every Day     Types: Cigarettes     Passive exposure: Current    Smokeless tobacco: Never   Vaping Use    Vaping status: Never Used   Substance and Sexual Activity    Alcohol use: Yes    Drug use: Yes     Types: Marijuana    Sexual activity: Yes   Other Topics Concern    Not on file   Social History Narrative    Not on file     Social Drivers of Health     Financial Resource Strain: Not on file   Food Insecurity: Not on file   Transportation Needs: Not on file   Physical Activity: Not on file   Stress: Not on file   Social Connections: Not on file   Intimate Partner Violence: Not on file   Housing Stability: Not on file         ROS  Review of systems reviewed and pertinent positives mentioned in HPI.      PHYSICAL EXAM  On examination of the right hip:  Normal gait, normal alignment  no ecchymosis, muscular atrophy or pelvic asymmetry.    Normal ROM in hip flexion,  internal rotation, external rotation, abduction, adduction and prone extension.    Normal strength with hip flexion adduction, abduction, external rotation, internal rotation and extension.   No tenderness to palpation over ASIS, AIIS, pubic symphysis, iliopsoas, adductor group, rectus  abdominus, greater trochanter, IT band, SI joint, ischial tuberosity or piriformis.  Neurovascularly intact.  Normal sensation to light touch. Normal dermatomes. Dorsalis pedis and posterior tibal pusles 2+ b/l.    Negative log-roll test. Positive JESSICA test.  Negative Straight Leg test, negative hop test        IMAGING  I personally reviewed the patient's x-ray images and reports of the right hip. The xrays show that no fractures or dislocations.  Normal joint spacing        ASSESSMENT: right hip UGO, flexor strain    PLAN: I discussed with the patient the differential diagnosis, complex overuse and degenerative related nature of the condition(s) and available treatment option(s). The patient was given a prescription for physical therapy.  Physical therapy is medically necessary to improve strength, balance, range of motion and functional outcomes after injury and/or surgery. Patient was given a handout and instructed on an at home stretching program.  They should do these exercises 3 times per day for 6 weeks and then daily. Patient can use OTC Voltaren gel, biofreeze or  aspercream for pain and continue to ice and elevate supported at the calf to reduce swelling. All the patient's questions were answered. The patient agrees with the above plan.  Follow up with hip specialist      Clif Barbosa PA-C                        [1]   Past Medical History:  Diagnosis Date    Anxiety     Chronic bronchitis (Multi)     Hypertension     Sinus tachycardia    [2]   Past Surgical History:  Procedure Laterality Date    BARTHOLIN GLAND CYST EXCISION      BILATERAL SALPINGOOPHORECTOMY  02/2025    at [facility]    BUNIONECTOMY      LAPAROSCOPIC HYSTERECTOMY  02/2025    at [facility]

## 2025-06-03 DIAGNOSIS — M25.851 FEMOROACETABULAR IMPINGEMENT WITH OSTEOPHYTE OF RIGHT HIP: Primary | ICD-10-CM

## 2025-06-03 DIAGNOSIS — M25.751 FEMOROACETABULAR IMPINGEMENT WITH OSTEOPHYTE OF RIGHT HIP: Primary | ICD-10-CM

## 2025-06-05 ENCOUNTER — APPOINTMENT (OUTPATIENT)
Dept: PRIMARY CARE | Facility: CLINIC | Age: 40
End: 2025-06-05
Payer: MEDICAID

## 2025-06-24 ENCOUNTER — APPOINTMENT (OUTPATIENT)
Dept: ORTHOPEDIC SURGERY | Facility: HOSPITAL | Age: 40
End: 2025-06-24
Payer: MEDICAID

## 2025-06-26 ENCOUNTER — APPOINTMENT (OUTPATIENT)
Dept: ORTHOPEDIC SURGERY | Facility: CLINIC | Age: 40
End: 2025-06-26
Payer: MEDICAID

## 2025-07-16 PROBLEM — E87.6 HYPOKALEMIA: Status: ACTIVE | Noted: 2025-07-16

## 2025-07-17 ENCOUNTER — APPOINTMENT (OUTPATIENT)
Dept: PRIMARY CARE | Facility: CLINIC | Age: 40
End: 2025-07-17
Payer: MEDICAID

## 2025-07-18 ENCOUNTER — OFFICE VISIT (OUTPATIENT)
Facility: HOSPITAL | Age: 40
End: 2025-07-18

## 2025-07-18 VITALS
WEIGHT: 117.2 LBS | OXYGEN SATURATION: 100 % | SYSTOLIC BLOOD PRESSURE: 131 MMHG | BODY MASS INDEX: 22.13 KG/M2 | DIASTOLIC BLOOD PRESSURE: 95 MMHG | HEIGHT: 61 IN | HEART RATE: 99 BPM | TEMPERATURE: 97.2 F

## 2025-07-18 DIAGNOSIS — R00.0 TACHYCARDIA: ICD-10-CM

## 2025-07-18 DIAGNOSIS — R63.4 WEIGHT LOSS: Primary | ICD-10-CM

## 2025-07-18 DIAGNOSIS — F41.9 ANXIETY: ICD-10-CM

## 2025-07-18 PROCEDURE — 99214 OFFICE O/P EST MOD 30 MIN: CPT | Mod: GC

## 2025-07-18 PROCEDURE — 3008F BODY MASS INDEX DOCD: CPT

## 2025-07-18 PROCEDURE — 99204 OFFICE O/P NEW MOD 45 MIN: CPT

## 2025-07-18 RX ORDER — BUPROPION HYDROCHLORIDE 150 MG/1
150 TABLET ORAL EVERY MORNING
Qty: 30 TABLET | Refills: 1 | Status: SHIPPED | OUTPATIENT
Start: 2025-07-18 | End: 2025-09-16

## 2025-07-18 ASSESSMENT — PAIN SCALES - GENERAL: PAINLEVEL_OUTOF10: 0-NO PAIN

## 2025-07-18 NOTE — PROGRESS NOTES
"Subjective   Patient ID: Simi Mcnair is a 40 y.o. female who presents for Establish Care (Weight gain needed ).    HPI     Review of Systems    Objective   BP (!) 131/95 (BP Location: Right arm, Patient Position: Sitting, BP Cuff Size: Adult)   Pulse 99   Temp 36.2 °C (97.2 °F) (Temporal)   Ht (!) 1.549 m (5' 1\")   Wt 53.2 kg (117 lb 3.2 oz)   SpO2 100%   BMI 22.14 kg/m²     Physical Exam    Assessment/Plan   Problem List Items Addressed This Visit           ICD-10-CM    Anxiety F41.9    Relevant Medications    buPROPion XL (Wellbutrin XL) 150 mg 24 hr tablet     Other Visit Diagnoses         Codes      Weight loss    -  Primary R63.4    Relevant Orders    TSH    T4, free    Triiodothyronine, Total    Referral to Nutrition Services      Tachycardia     R00.0    Relevant Orders    TSH    T4, free    Triiodothyronine, Total               "   General: Normal range of motion.      Cervical back: Normal range of motion and neck supple. No tenderness.     Skin:     General: Skin is warm and dry.      Capillary Refill: Capillary refill takes less than 2 seconds.      Coloration: Skin is not jaundiced.     Neurological:      General: No focal deficit present.      Mental Status: She is alert and oriented to person, place, and time.     Psychiatric:         Mood and Affect: Mood normal.         Behavior: Behavior normal.         Assessment/Plan   Problem List Items Addressed This Visit           ICD-10-CM    Anxiety F41.9    Relevant Medications    buPROPion XL (Wellbutrin XL) 150 mg 24 hr tablet     Other Visit Diagnoses         Codes      Weight loss    -  Primary R63.4    Relevant Orders    TSH    T4, free    Triiodothyronine, Total    Referral to Nutrition Services      Tachycardia     R00.0    Relevant Orders    TSH    T4, free    Triiodothyronine, Total          #weight loss  #sinus tachycardia  - on chart review, patient's weight in November 2024 was 114lb and in May 2025 was 120lb. Today weight is 117.  - due to hx of sinus tach and her reported appetite changes, will order thyroid panel to evaluate for hyperthyroidism.  - referral to nutritionist provided per patient request. Discussed possible calorie challenge to see if patient can gain weight if consuming enough    #anxiety  - has been on wellbutrin in the past. Rx provided today (150mg XR daily).    Follow-up in 8 weeks for weight check, sooner if necessary.    Patient seen and discussed with attending physician, Ree Lance MD.    Nafisa Hi DO  Family Medicine, PGY-3

## 2025-07-24 ENCOUNTER — PATIENT OUTREACH (OUTPATIENT)
Dept: CARE COORDINATION | Facility: CLINIC | Age: 40
End: 2025-07-24
Payer: MEDICAID

## 2025-07-30 NOTE — PROGRESS NOTES
I saw and evaluated the patient. I personally obtained the key and critical portions of the history and physical exam or was physically present for key and critical portions performed by the resident/fellow. I reviewed the resident/fellow's documentation and discussed the patient with the resident/fellow. I agree with the resident/fellow's medical decision making as documented in the note. If weight loss is due to depression, expect improvement, but bupropion may also induce dsome weight loss- monitor at next visit. TSH is also pending.     Ree Lance MD

## 2025-08-03 ENCOUNTER — APPOINTMENT (OUTPATIENT)
Dept: RADIOLOGY | Facility: HOSPITAL | Age: 40
End: 2025-08-03

## 2025-08-03 ENCOUNTER — HOSPITAL ENCOUNTER (EMERGENCY)
Facility: HOSPITAL | Age: 40
Discharge: HOME | End: 2025-08-03

## 2025-08-03 VITALS
RESPIRATION RATE: 16 BRPM | OXYGEN SATURATION: 98 % | DIASTOLIC BLOOD PRESSURE: 94 MMHG | HEIGHT: 61 IN | BODY MASS INDEX: 22.09 KG/M2 | SYSTOLIC BLOOD PRESSURE: 146 MMHG | TEMPERATURE: 98.3 F | WEIGHT: 117 LBS | HEART RATE: 105 BPM

## 2025-08-03 DIAGNOSIS — M79.672 LEFT FOOT PAIN: Primary | ICD-10-CM

## 2025-08-03 PROCEDURE — 2500000001 HC RX 250 WO HCPCS SELF ADMINISTERED DRUGS (ALT 637 FOR MEDICARE OP): Mod: SE | Performed by: PHYSICIAN ASSISTANT

## 2025-08-03 PROCEDURE — 73630 X-RAY EXAM OF FOOT: CPT | Mod: LEFT SIDE | Performed by: STUDENT IN AN ORGANIZED HEALTH CARE EDUCATION/TRAINING PROGRAM

## 2025-08-03 PROCEDURE — 73630 X-RAY EXAM OF FOOT: CPT | Mod: LT

## 2025-08-03 PROCEDURE — 99283 EMERGENCY DEPT VISIT LOW MDM: CPT

## 2025-08-03 RX ORDER — IBUPROFEN 600 MG/1
600 TABLET, FILM COATED ORAL ONCE
Status: COMPLETED | OUTPATIENT
Start: 2025-08-03 | End: 2025-08-03

## 2025-08-03 RX ORDER — IBUPROFEN 600 MG/1
600 TABLET, FILM COATED ORAL EVERY 6 HOURS PRN
Qty: 20 TABLET | Refills: 0 | Status: SHIPPED | OUTPATIENT
Start: 2025-08-03 | End: 2025-08-08

## 2025-08-03 RX ORDER — ACETAMINOPHEN 325 MG/1
650 TABLET ORAL EVERY 6 HOURS PRN
Qty: 20 TABLET | Refills: 0 | Status: SHIPPED | OUTPATIENT
Start: 2025-08-03 | End: 2025-08-08

## 2025-08-03 RX ORDER — ACETAMINOPHEN 325 MG/1
975 TABLET ORAL ONCE
Status: COMPLETED | OUTPATIENT
Start: 2025-08-03 | End: 2025-08-03

## 2025-08-03 RX ADMIN — IBUPROFEN 600 MG: 600 TABLET ORAL at 15:13

## 2025-08-03 RX ADMIN — ACETAMINOPHEN 975 MG: 325 TABLET ORAL at 15:13

## 2025-08-03 ASSESSMENT — LIFESTYLE VARIABLES
TOTAL SCORE: 0
HAVE PEOPLE ANNOYED YOU BY CRITICIZING YOUR DRINKING: NO
EVER HAD A DRINK FIRST THING IN THE MORNING TO STEADY YOUR NERVES TO GET RID OF A HANGOVER: NO
EVER FELT BAD OR GUILTY ABOUT YOUR DRINKING: NO
HAVE YOU EVER FELT YOU SHOULD CUT DOWN ON YOUR DRINKING: NO

## 2025-08-03 NOTE — Clinical Note
Simi Mcnair was seen and treated in our emergency department on 8/3/2025.  She may return to work on 08/05/2025.       If you have any questions or concerns, please don't hesitate to call.      Savannah Chavez PA-C

## 2025-08-03 NOTE — ED TRIAGE NOTES
Pt presents to ed for L foot pain x 2-3 weeks. Pt had reconstructive surgery on feet where pins were placed back in 2023. Pt has had no issues since but several weeks ago, her L foot has started to become more tender, and she feels as iff she can feel the pins under her skin. Pt denies warmth around the affected area, nausea, or any other symptoms.

## 2025-08-03 NOTE — ED PROVIDER NOTES
"Emergency Department Encounter  [facility] (formerly [facility]3] EMERGENCY MEDICINE    Patient: Simi Mcnair  MRN: [id number]  : 1985  Date of Evaluation: 8/3/2025  ED Provider: Savannah Chavez PA-C        History of Present Illness     This is a 40-year-old female with past medical history of previous bilateral bunion surgery in  who presents to the ED with pain to her left foot around the the site of her previous surgery.  She states that this pain has been present for the past 2 weeks.  She states that she feels a small bump and is unsure if it is the hardware.  Nothing is poked through the skin.  No surrounding erythema or excess warmth.  No change to the scar around the surgical site.  She denies any fevers or chills.  States that her podiatrist is no longer with  system so she has not been able to follow-up with them.  Denies any other complaints at this time.  Has been able to ambulate with some pain.  Has not been taking anything at home for her symptoms.      History provided by:  Patient   used: No             Visit Vitals  BP (!) 146/94 (Patient Position: Sitting)   Pulse (!) 105   Temp 36.8 °C (98.3 °F) (Oral)   Resp 16   Ht (!) 1.549 m (5' 1\")   Wt 53.1 kg (117 lb)   SpO2 98%   BMI 22.11 kg/m²   OB Status Having periods   Smoking Status Every Day   BSA 1.51 m²          Physical Exam       Triage vitals:  T 36.8 °C (98.3 °F)  HR (!) 105  BP (!) 146/94  RR 16  O2 98 % None (Room air)    Physical Exam     Physical exam:   General: Vitals noted, no distress. Afebrile.   EENT:  Hearing grossly intact. Normal phonation. MMM. Airway patient. PERRL. EOMI.   Neck: No midline tenderness or paraspinal tenderness. FROM.   Cardiac: Regular, rate, rhythm. Normal S1 and S2.  No murmurs, gallops, rubs.   Pulmonary: Good air exchange. Lungs clear bilaterally. No wheezes, rhonchi, rales. No accessory muscle use.   Extremities: No peripheral edema.  Full range of motion. Moves all " extremities freely.  Tenderness palpation to the left foot over the distal aspect of the first metatarsal.  No obvious deformity.  No erythema or excess warmth of the skin.  DP PT pulses full equal bilaterally.  See photo of feet below.  Skin: No rash. Warm and Dry.   Neuro: No focal neurologic deficits. CN 2-12 grossly intact. Sensation equal bilaterally. No weakness.           Results       Labs Reviewed - No data to display    XR foot left 3+ views   Final Result   Postsurgical changes, as above. No evidence of hardware complication.             MACRO:   None        Signed by: Thuan Penn 8/3/2025 3:26 PM   Dictation workstation:   [id number]            Medical Decision Making & ED Course         ED Course & MDM     Medical Decision Making  This is a 40-year-old female with past medical history of previous bilateral bunion surgeries who presents to the ED with pain around her previous surgical site.  Vital signs show mild tachycardia 105 beats minute upon arrival to the ED however otherwise grossly unremarkable.  Patient was afebrile.  On examination she is overall well-appearing.  She was ambulatory with steady gait.  Physical examination shows no erythema or excess warmth to her feet bilaterally.  Neurovascular intact throughout her bilateral feet.  Tenderness palpation over the distal aspect of the first metatarsal.  No open wounds.  No evidence of infection on examination.  X-ray of her foot obtained and showed no acute fracture or dislocation.  No evidence of hardware failure.  Patient medicated with Motrin and Tylenol here in the ED.  The patient was informed of her results.  I advise she follow-up with podiatry as an outpatient and referral was placed for this patient.  She was written prescriptions for Motrin and Tylenol for her symptoms at home as well as a note for her job.  She was given signs and symptoms that she should return to the ED with.  She had no further questions at this time and was  discharged from the ED in stable condition.         Diagnoses as of 08/03/25 1603   Left foot pain         Independent Result Review and Interpretation: X-ray left foot with hardware in place from previous bunion surgery with no evidence of fracture to the hardware          Disposition   As a result of the work-up, the patient was discharged home.  she was informed of her diagnosis and instructed to come back with any concerns or worsening of condition.  she and was agreeable to the plan as discussed above.  she was given the opportunity to ask questions.  All of the patient's questions were answered.    Procedures     Patient was seen independently    Procedures    Savannah Chavez PA-C  Emergency Medicine      Savannah Cahvez PA-C  08/03/25 1601

## 2025-08-05 ENCOUNTER — DOCUMENTATION (OUTPATIENT)
Dept: CARE COORDINATION | Facility: CLINIC | Age: 40
End: 2025-08-05

## 2025-08-05 ENCOUNTER — APPOINTMENT (OUTPATIENT)
Dept: CARE COORDINATION | Facility: CLINIC | Age: 40
End: 2025-08-05
Payer: MEDICAID

## 2025-08-05 NOTE — PROGRESS NOTES
Patient did not join ZO[facility] 11:00 appointment:  called patient at 11:05; no answer.  Left message regarding scheduled appointment; requested she call asap to continue with appointment or call to reschedule.  No return call; appointment closed at 11:26.

## 2025-08-15 ENCOUNTER — OFFICE VISIT (OUTPATIENT)
Dept: ORTHOPEDIC SURGERY | Facility: HOSPITAL | Age: 40
End: 2025-08-15
Payer: COMMERCIAL

## (undated) DEVICE — BANDAGE, GAUZE, CONFORMING, KERLIX, 6 PLY, 4.5 IN X 4.1 YD

## (undated) DEVICE — COVER, TABLE, 54X90

## (undated) DEVICE — RASP, RECIPROCATING, CROSSCUT, TEAR, LARGE, 14 X 7 MM

## (undated) DEVICE — COVER, MAYO STAND, W/PAD, 23 IN, DISPOSABLE, PLASTIC, LF, STERILE

## (undated) DEVICE — BANDAGE, COFLEX, 6 X 5 YDS, FOAM TAN, STERILE, LF

## (undated) DEVICE — DRESSING, GAUZE, SPONGE, 12 PLY, CURITY, 4 X 4 IN, RIGID TRAY, STERILE

## (undated) DEVICE — DRAPE COVER, C ARM, FLOUROSCAN IMAGING SYS

## (undated) DEVICE — NEEDLE, HYPODERMIC, SAFETY, GLIDE, 18G X 1.5"

## (undated) DEVICE — BANDAGE, ELASTIC, ACE, ACE, DOUBLE LENGTH, 6 X 550 IN, LF

## (undated) DEVICE — BANDAGE, STRETCH, CONFORM, 3 IN X 4.1 YD, STERILE

## (undated) DEVICE — ELECTRODE, ELECTROSURGICAL, COATED NEEDLE, EDGE, STERILE

## (undated) DEVICE — DRAPE KIT, MINI C-ARM

## (undated) DEVICE — Device

## (undated) DEVICE — SPONGE, LAP, XRAY DECT, 18IN X 18IN, W/MASTER DMT, STERILE

## (undated) DEVICE — DRAPE, SHEET, EXTREMITY, W/ARM BOARD COVERS, 87 X 106 X 128 IN, DISPOSABLE, LF, STERILE

## (undated) DEVICE — GOWN, ASTOUND, XL

## (undated) DEVICE — BAG, BANDED, 36IN X 28IN

## (undated) DEVICE — BASIN KIT, SINGLE

## (undated) DEVICE — DRAPE, U-DRAPE, NON STERILE

## (undated) DEVICE — DRESSING, NON-ADHERENT, OIL EMULSION, CURITY, 3 X 8 IN, STERILE

## (undated) DEVICE — DRAPE, SHEET, THREE QUARTER, FAN FOLD, 57 X 77 IN

## (undated) DEVICE — APPLICATOR, CHLORAPREP, W/ORANGE TINT, 26ML

## (undated) DEVICE — STRIP, SKIN CLOSURE, STERI STRIP, REINFORCED, 0.5 X 4 IN

## (undated) DEVICE — DRAPE, SHEET, U, W/ADHESIVE STRIP, IMPERVIOUS, 60 X 70 IN, DISPOSABLE, LF, STERILE

## (undated) DEVICE — BANDAGE, ELASTIC, PREMIUM, SELF-CLOSE, 4 IN X 5.5 YD, STERILE

## (undated) DEVICE — BANDAGE, ESMARK 4 IN X 9 FT, STERILE

## (undated) DEVICE — DRILL BIT, 2.0MM, CALIBRATED

## (undated) DEVICE — BANDAGE, FLEX-WRAP, 3 IN X 5 YD, TAN, NON-STERILE

## (undated) DEVICE — SYRINGE, 10 CC, LUER LOCK